# Patient Record
Sex: MALE | Race: BLACK OR AFRICAN AMERICAN | Employment: UNEMPLOYED | URBAN - METROPOLITAN AREA
[De-identification: names, ages, dates, MRNs, and addresses within clinical notes are randomized per-mention and may not be internally consistent; named-entity substitution may affect disease eponyms.]

---

## 2020-10-27 ENCOUNTER — HOSPITAL ENCOUNTER (INPATIENT)
Age: 40
LOS: 2 days | Discharge: HOME | DRG: 816 | End: 2020-10-30
Attending: EMERGENCY MEDICINE | Admitting: FAMILY MEDICINE
Payer: COMMERCIAL

## 2020-10-27 ENCOUNTER — APPOINTMENT (OUTPATIENT)
Dept: GENERAL RADIOLOGY | Age: 40
DRG: 816 | End: 2020-10-27
Payer: COMMERCIAL

## 2020-10-27 LAB
ACETAMINOPHEN LEVEL: <5 MCG/ML (ref 10–30)
ALBUMIN SERPL-MCNC: 4.4 G/DL (ref 3.5–5.2)
ALP BLD-CCNC: 76 U/L (ref 40–129)
ALT SERPL-CCNC: 39 U/L (ref 0–40)
ANION GAP SERPL CALCULATED.3IONS-SCNC: 18 MMOL/L (ref 7–16)
AST SERPL-CCNC: 46 U/L (ref 0–39)
BASOPHILS ABSOLUTE: 0.01 E9/L (ref 0–0.2)
BASOPHILS RELATIVE PERCENT: 0.1 % (ref 0–2)
BILIRUB SERPL-MCNC: <0.2 MG/DL (ref 0–1.2)
BUN BLDV-MCNC: 9 MG/DL (ref 6–20)
CALCIUM SERPL-MCNC: 8.8 MG/DL (ref 8.6–10.2)
CHLORIDE BLD-SCNC: 100 MMOL/L (ref 98–107)
CO2: 23 MMOL/L (ref 22–29)
CREAT SERPL-MCNC: 0.9 MG/DL (ref 0.7–1.2)
EOSINOPHILS ABSOLUTE: 0.01 E9/L (ref 0.05–0.5)
EOSINOPHILS RELATIVE PERCENT: 0.1 % (ref 0–6)
ETHANOL: 230 MG/DL (ref 0–0.08)
GFR AFRICAN AMERICAN: >60
GFR NON-AFRICAN AMERICAN: >60 ML/MIN/1.73
GLUCOSE BLD-MCNC: 112 MG/DL (ref 74–99)
HCT VFR BLD CALC: 44.6 % (ref 37–54)
HEMOGLOBIN: 15.3 G/DL (ref 12.5–16.5)
IMMATURE GRANULOCYTES #: 0.03 E9/L
IMMATURE GRANULOCYTES %: 0.3 % (ref 0–5)
LYMPHOCYTES ABSOLUTE: 1.4 E9/L (ref 1.5–4)
LYMPHOCYTES RELATIVE PERCENT: 15.2 % (ref 20–42)
MAGNESIUM: 2.1 MG/DL (ref 1.6–2.6)
MCH RBC QN AUTO: 33.6 PG (ref 26–35)
MCHC RBC AUTO-ENTMCNC: 34.3 % (ref 32–34.5)
MCV RBC AUTO: 97.8 FL (ref 80–99.9)
MONOCYTES ABSOLUTE: 0.62 E9/L (ref 0.1–0.95)
MONOCYTES RELATIVE PERCENT: 6.7 % (ref 2–12)
NEUTROPHILS ABSOLUTE: 7.16 E9/L (ref 1.8–7.3)
NEUTROPHILS RELATIVE PERCENT: 77.6 % (ref 43–80)
PDW BLD-RTO: 13.2 FL (ref 11.5–15)
PLATELET # BLD: 223 E9/L (ref 130–450)
PMV BLD AUTO: 11.3 FL (ref 7–12)
POTASSIUM REFLEX MAGNESIUM: 3.2 MMOL/L (ref 3.5–5)
RBC # BLD: 4.56 E12/L (ref 3.8–5.8)
REASON FOR REJECTION: NORMAL
REJECTED TEST: NORMAL
SALICYLATE, SERUM: <0.3 MG/DL (ref 0–30)
SODIUM BLD-SCNC: 141 MMOL/L (ref 132–146)
TOTAL PROTEIN: 7.9 G/DL (ref 6.4–8.3)
TRICYCLIC ANTIDEPRESSANTS SCREEN SERUM: NEGATIVE NG/ML
WBC # BLD: 9.2 E9/L (ref 4.5–11.5)

## 2020-10-27 PROCEDURE — 83735 ASSAY OF MAGNESIUM: CPT

## 2020-10-27 PROCEDURE — 6370000000 HC RX 637 (ALT 250 FOR IP): Performed by: EMERGENCY MEDICINE

## 2020-10-27 PROCEDURE — 80053 COMPREHEN METABOLIC PANEL: CPT

## 2020-10-27 PROCEDURE — 71045 X-RAY EXAM CHEST 1 VIEW: CPT

## 2020-10-27 PROCEDURE — 85025 COMPLETE CBC W/AUTO DIFF WBC: CPT

## 2020-10-27 PROCEDURE — 80307 DRUG TEST PRSMV CHEM ANLYZR: CPT

## 2020-10-27 PROCEDURE — 96375 TX/PRO/DX INJ NEW DRUG ADDON: CPT

## 2020-10-27 PROCEDURE — 93005 ELECTROCARDIOGRAM TRACING: CPT | Performed by: EMERGENCY MEDICINE

## 2020-10-27 PROCEDURE — 36415 COLL VENOUS BLD VENIPUNCTURE: CPT

## 2020-10-27 PROCEDURE — G0480 DRUG TEST DEF 1-7 CLASSES: HCPCS

## 2020-10-27 PROCEDURE — 99285 EMERGENCY DEPT VISIT HI MDM: CPT

## 2020-10-27 PROCEDURE — 6360000002 HC RX W HCPCS: Performed by: EMERGENCY MEDICINE

## 2020-10-27 PROCEDURE — 96374 THER/PROPH/DIAG INJ IV PUSH: CPT

## 2020-10-27 RX ORDER — ONDANSETRON 4 MG/1
4 TABLET, ORALLY DISINTEGRATING ORAL ONCE
Status: COMPLETED | OUTPATIENT
Start: 2020-10-27 | End: 2020-10-27

## 2020-10-27 RX ORDER — NALOXONE HYDROCHLORIDE 1 MG/ML
INJECTION INTRAMUSCULAR; INTRAVENOUS; SUBCUTANEOUS
Status: DISPENSED
Start: 2020-10-27 | End: 2020-10-28

## 2020-10-27 RX ORDER — ONDANSETRON 2 MG/ML
4 INJECTION INTRAMUSCULAR; INTRAVENOUS ONCE
Status: COMPLETED | OUTPATIENT
Start: 2020-10-27 | End: 2020-10-27

## 2020-10-27 RX ORDER — NALOXONE HYDROCHLORIDE 1 MG/ML
2 INJECTION INTRAMUSCULAR; INTRAVENOUS; SUBCUTANEOUS ONCE
Status: COMPLETED | OUTPATIENT
Start: 2020-10-27 | End: 2020-10-27

## 2020-10-27 RX ADMIN — NALOXONE HYDROCHLORIDE 2 MG: 1 INJECTION PARENTERAL at 21:53

## 2020-10-27 RX ADMIN — ONDANSETRON 4 MG: 2 INJECTION INTRAMUSCULAR; INTRAVENOUS at 22:51

## 2020-10-27 RX ADMIN — ONDANSETRON 4 MG: 4 TABLET, ORALLY DISINTEGRATING ORAL at 20:26

## 2020-10-28 ENCOUNTER — APPOINTMENT (OUTPATIENT)
Dept: CT IMAGING | Age: 40
DRG: 816 | End: 2020-10-28
Payer: COMMERCIAL

## 2020-10-28 PROBLEM — K21.9 GERD (GASTROESOPHAGEAL REFLUX DISEASE): Status: ACTIVE | Noted: 2020-10-28

## 2020-10-28 PROBLEM — T43.601A OVERDOSE OF ILLICIT DRUG (HCC): Status: ACTIVE | Noted: 2020-10-28

## 2020-10-28 PROBLEM — J96.01 ACUTE HYPOXEMIC RESPIRATORY FAILURE (HCC): Status: ACTIVE | Noted: 2020-10-28

## 2020-10-28 PROBLEM — R11.10 INTRACTABLE VOMITING: Status: ACTIVE | Noted: 2020-10-28

## 2020-10-28 PROBLEM — F17.200 TOBACCO DEPENDENCE: Status: ACTIVE | Noted: 2020-10-28

## 2020-10-28 PROBLEM — E87.6 HYPOKALEMIA: Status: ACTIVE | Noted: 2020-10-28

## 2020-10-28 PROBLEM — F10.929 ALCOHOL INTOXICATION (HCC): Status: ACTIVE | Noted: 2020-10-28

## 2020-10-28 PROBLEM — E78.5 HLD (HYPERLIPIDEMIA): Status: ACTIVE | Noted: 2020-10-28

## 2020-10-28 PROBLEM — I10 HTN (HYPERTENSION): Status: ACTIVE | Noted: 2020-10-28

## 2020-10-28 PROBLEM — G92.9 TOXIC ENCEPHALOPATHY: Status: ACTIVE | Noted: 2020-10-28

## 2020-10-28 PROBLEM — F10.10 ALCOHOL ABUSE: Status: ACTIVE | Noted: 2020-10-28

## 2020-10-28 PROBLEM — F19.10 POLYSUBSTANCE ABUSE (HCC): Status: ACTIVE | Noted: 2020-10-28

## 2020-10-28 LAB
AMPHETAMINE SCREEN, URINE: NOT DETECTED
ANION GAP SERPL CALCULATED.3IONS-SCNC: 12 MMOL/L (ref 7–16)
BARBITURATE SCREEN URINE: NOT DETECTED
BASOPHILS ABSOLUTE: 0.03 E9/L (ref 0–0.2)
BASOPHILS RELATIVE PERCENT: 0.2 % (ref 0–2)
BENZODIAZEPINE SCREEN, URINE: NOT DETECTED
BUN BLDV-MCNC: 9 MG/DL (ref 6–20)
CALCIUM SERPL-MCNC: 8.3 MG/DL (ref 8.6–10.2)
CANNABINOID SCREEN URINE: POSITIVE
CHLORIDE BLD-SCNC: 96 MMOL/L (ref 98–107)
CO2: 26 MMOL/L (ref 22–29)
COCAINE METABOLITE SCREEN URINE: NOT DETECTED
CREAT SERPL-MCNC: 0.8 MG/DL (ref 0.7–1.2)
EKG ATRIAL RATE: 105 BPM
EKG P AXIS: 60 DEGREES
EKG P-R INTERVAL: 142 MS
EKG Q-T INTERVAL: 336 MS
EKG QRS DURATION: 78 MS
EKG QTC CALCULATION (BAZETT): 444 MS
EKG R AXIS: 77 DEGREES
EKG T AXIS: 66 DEGREES
EKG VENTRICULAR RATE: 105 BPM
EOSINOPHILS ABSOLUTE: 0.01 E9/L (ref 0.05–0.5)
EOSINOPHILS RELATIVE PERCENT: 0.1 % (ref 0–6)
FENTANYL SCREEN, URINE: POSITIVE
GFR AFRICAN AMERICAN: >60
GFR NON-AFRICAN AMERICAN: >60 ML/MIN/1.73
GLUCOSE BLD-MCNC: 95 MG/DL (ref 74–99)
HCT VFR BLD CALC: 40.1 % (ref 37–54)
HEMOGLOBIN: 13.8 G/DL (ref 12.5–16.5)
IMMATURE GRANULOCYTES #: 0.1 E9/L
IMMATURE GRANULOCYTES %: 0.6 % (ref 0–5)
LYMPHOCYTES ABSOLUTE: 2.46 E9/L (ref 1.5–4)
LYMPHOCYTES RELATIVE PERCENT: 13.6 % (ref 20–42)
Lab: ABNORMAL
MAGNESIUM: 1.5 MG/DL (ref 1.6–2.6)
MCH RBC QN AUTO: 33.6 PG (ref 26–35)
MCHC RBC AUTO-ENTMCNC: 34.4 % (ref 32–34.5)
MCV RBC AUTO: 97.6 FL (ref 80–99.9)
METHADONE SCREEN, URINE: NOT DETECTED
MONOCYTES ABSOLUTE: 1.25 E9/L (ref 0.1–0.95)
MONOCYTES RELATIVE PERCENT: 6.9 % (ref 2–12)
NEUTROPHILS ABSOLUTE: 14.18 E9/L (ref 1.8–7.3)
NEUTROPHILS RELATIVE PERCENT: 78.6 % (ref 43–80)
OPIATE SCREEN URINE: NOT DETECTED
OXYCODONE URINE: NOT DETECTED
PDW BLD-RTO: 13.2 FL (ref 11.5–15)
PHENCYCLIDINE SCREEN URINE: NOT DETECTED
PLATELET # BLD: 192 E9/L (ref 130–450)
PMV BLD AUTO: 11.6 FL (ref 7–12)
POTASSIUM REFLEX MAGNESIUM: 3.3 MMOL/L (ref 3.5–5)
RBC # BLD: 4.11 E12/L (ref 3.8–5.8)
SODIUM BLD-SCNC: 134 MMOL/L (ref 132–146)
TOTAL CK: 376 U/L (ref 20–200)
WBC # BLD: 18 E9/L (ref 4.5–11.5)

## 2020-10-28 PROCEDURE — 85025 COMPLETE CBC W/AUTO DIFF WBC: CPT

## 2020-10-28 PROCEDURE — 6360000002 HC RX W HCPCS: Performed by: FAMILY MEDICINE

## 2020-10-28 PROCEDURE — 2580000003 HC RX 258: Performed by: FAMILY MEDICINE

## 2020-10-28 PROCEDURE — 80307 DRUG TEST PRSMV CHEM ANLYZR: CPT

## 2020-10-28 PROCEDURE — 93010 ELECTROCARDIOGRAM REPORT: CPT | Performed by: INTERNAL MEDICINE

## 2020-10-28 PROCEDURE — 2060000000 HC ICU INTERMEDIATE R&B

## 2020-10-28 PROCEDURE — 83735 ASSAY OF MAGNESIUM: CPT

## 2020-10-28 PROCEDURE — 70450 CT HEAD/BRAIN W/O DYE: CPT

## 2020-10-28 PROCEDURE — 2500000003 HC RX 250 WO HCPCS: Performed by: FAMILY MEDICINE

## 2020-10-28 PROCEDURE — 80048 BASIC METABOLIC PNL TOTAL CA: CPT

## 2020-10-28 PROCEDURE — 36415 COLL VENOUS BLD VENIPUNCTURE: CPT

## 2020-10-28 PROCEDURE — C9113 INJ PANTOPRAZOLE SODIUM, VIA: HCPCS | Performed by: FAMILY MEDICINE

## 2020-10-28 PROCEDURE — 6370000000 HC RX 637 (ALT 250 FOR IP): Performed by: FAMILY MEDICINE

## 2020-10-28 PROCEDURE — 82550 ASSAY OF CK (CPK): CPT

## 2020-10-28 RX ORDER — ONDANSETRON 2 MG/ML
4 INJECTION INTRAMUSCULAR; INTRAVENOUS EVERY 6 HOURS PRN
Status: DISCONTINUED | OUTPATIENT
Start: 2020-10-28 | End: 2020-10-30 | Stop reason: HOSPADM

## 2020-10-28 RX ORDER — PROMETHAZINE HYDROCHLORIDE 25 MG/1
12.5 TABLET ORAL EVERY 6 HOURS PRN
Status: DISCONTINUED | OUTPATIENT
Start: 2020-10-28 | End: 2020-10-30 | Stop reason: HOSPADM

## 2020-10-28 RX ORDER — POTASSIUM CHLORIDE 7.45 MG/ML
10 INJECTION INTRAVENOUS
Status: COMPLETED | OUTPATIENT
Start: 2020-10-28 | End: 2020-10-28

## 2020-10-28 RX ORDER — PANTOPRAZOLE SODIUM 40 MG/10ML
40 INJECTION, POWDER, LYOPHILIZED, FOR SOLUTION INTRAVENOUS DAILY
Status: DISCONTINUED | OUTPATIENT
Start: 2020-10-28 | End: 2020-10-28

## 2020-10-28 RX ORDER — AMLODIPINE BESYLATE 10 MG/1
10 TABLET ORAL DAILY
COMMUNITY

## 2020-10-28 RX ORDER — LISINOPRIL 10 MG/1
10 TABLET ORAL DAILY
Status: ON HOLD | COMMUNITY
End: 2021-08-12 | Stop reason: HOSPADM

## 2020-10-28 RX ORDER — PANTOPRAZOLE SODIUM 40 MG/10ML
40 INJECTION, POWDER, LYOPHILIZED, FOR SOLUTION INTRAVENOUS DAILY
Status: DISCONTINUED | OUTPATIENT
Start: 2020-10-28 | End: 2020-10-30 | Stop reason: HOSPADM

## 2020-10-28 RX ORDER — THIAMINE MONONITRATE (VIT B1) 100 MG
100 TABLET ORAL DAILY
Status: DISCONTINUED | OUTPATIENT
Start: 2020-10-28 | End: 2020-10-30 | Stop reason: HOSPADM

## 2020-10-28 RX ORDER — HYDROCHLOROTHIAZIDE 25 MG/1
25 TABLET ORAL DAILY
Status: ON HOLD | COMMUNITY
End: 2021-08-12 | Stop reason: HOSPADM

## 2020-10-28 RX ORDER — SODIUM CHLORIDE 0.9 % (FLUSH) 0.9 %
10 SYRINGE (ML) INJECTION EVERY 12 HOURS SCHEDULED
Status: DISCONTINUED | OUTPATIENT
Start: 2020-10-28 | End: 2020-10-30 | Stop reason: HOSPADM

## 2020-10-28 RX ORDER — FOLIC ACID 1 MG/1
1 TABLET ORAL DAILY
Status: DISCONTINUED | OUTPATIENT
Start: 2020-10-28 | End: 2020-10-30 | Stop reason: HOSPADM

## 2020-10-28 RX ORDER — SODIUM CHLORIDE 0.9 % (FLUSH) 0.9 %
10 SYRINGE (ML) INJECTION PRN
Status: DISCONTINUED | OUTPATIENT
Start: 2020-10-28 | End: 2020-10-30 | Stop reason: HOSPADM

## 2020-10-28 RX ORDER — FAMOTIDINE 20 MG/1
20 TABLET, FILM COATED ORAL 2 TIMES DAILY
COMMUNITY
End: 2021-10-28

## 2020-10-28 RX ORDER — M-VIT,TX,IRON,MINS/CALC/FOLIC 27MG-0.4MG
1 TABLET ORAL DAILY
Status: DISCONTINUED | OUTPATIENT
Start: 2020-10-28 | End: 2020-10-30 | Stop reason: HOSPADM

## 2020-10-28 RX ORDER — ATORVASTATIN CALCIUM 20 MG/1
20 TABLET, FILM COATED ORAL DAILY
Status: ON HOLD | COMMUNITY
End: 2020-12-14 | Stop reason: SDUPTHER

## 2020-10-28 RX ORDER — PANTOPRAZOLE SODIUM 40 MG/10ML
40 INJECTION, POWDER, LYOPHILIZED, FOR SOLUTION INTRAVENOUS DAILY
Status: DISCONTINUED | OUTPATIENT
Start: 2020-10-28 | End: 2020-10-28 | Stop reason: CLARIF

## 2020-10-28 RX ADMIN — SODIUM CHLORIDE, PRESERVATIVE FREE 10 ML: 5 INJECTION INTRAVENOUS at 08:17

## 2020-10-28 RX ADMIN — PANTOPRAZOLE SODIUM 40 MG: 40 INJECTION, POWDER, FOR SOLUTION INTRAVENOUS at 08:12

## 2020-10-28 RX ADMIN — SODIUM CHLORIDE 3 G: 900 INJECTION INTRAVENOUS at 18:57

## 2020-10-28 RX ADMIN — ONDANSETRON 4 MG: 2 INJECTION INTRAMUSCULAR; INTRAVENOUS at 08:17

## 2020-10-28 RX ADMIN — SODIUM CHLORIDE 3 G: 900 INJECTION INTRAVENOUS at 08:11

## 2020-10-28 RX ADMIN — SODIUM CHLORIDE 3 G: 900 INJECTION INTRAVENOUS at 12:55

## 2020-10-28 RX ADMIN — POTASSIUM CHLORIDE 10 MEQ: 10 INJECTION, SOLUTION INTRAVENOUS at 03:44

## 2020-10-28 RX ADMIN — Medication 10 ML: at 20:30

## 2020-10-28 RX ADMIN — MULTIPLE VITAMINS W/ MINERALS TAB 1 TABLET: TAB at 08:11

## 2020-10-28 RX ADMIN — Medication 10 ML: at 08:12

## 2020-10-28 RX ADMIN — POTASSIUM CHLORIDE 10 MEQ: 10 INJECTION, SOLUTION INTRAVENOUS at 05:14

## 2020-10-28 RX ADMIN — Medication 100 MG: at 08:11

## 2020-10-28 RX ADMIN — FOLIC ACID 1 MG: 1 TABLET ORAL at 08:22

## 2020-10-28 RX ADMIN — THIAMINE HYDROCHLORIDE: 100 INJECTION, SOLUTION INTRAMUSCULAR; INTRAVENOUS at 02:53

## 2020-10-28 ASSESSMENT — PAIN SCALES - GENERAL
PAINLEVEL_OUTOF10: 0

## 2020-10-28 NOTE — ED NOTES
Please call x [phone] to request psych consult prior to Pt being discharged    Thank you     Laure Ramírez, MSW, LSW  10/28/20 7542

## 2020-10-28 NOTE — ED NOTES
In room to introduce self to pt. Pt requesting PO intake at this time. Explained to pt that he was recently medicated for n/v.  Pt currently denies. This nurse to have conversation with previous nurse. Previous RN states that every time pt attempted PO intake, pt vomited. Pt provided with ice chips. Made aware about issue with emesis. Informed pt that if he tolerates ice chips, we can progress to something else. Pt and friend expressed understanding. Pt respirations are easy and unlabored. Cardiac monitoring in place. No signs of distress noted. Call light within reach. Friend remains at bedside.   Will continue to monitor     Deanna Powell RN  10/27/20 2547

## 2020-10-28 NOTE — ED NOTES
Pt noted to be tolerating PO fluids without nausea/emesis. Warm blankets applied. Pt made aware of CT scan ordered. Pt verbalized understanding of need for scan. Noted to have small lac above left eye, dried blood/scabbing present. Pt states he fell a day or so ago while intoxicated and not sure if he has a concussion. Declined additional comfort measures. Cardiac monitoring continued. Respirations are easy and unlabored. No signs of distress noted. Call light within reach.   Will continue to monitor     Allen Centeno RN  10/28/20 1000

## 2020-10-28 NOTE — ED PROVIDER NOTES
HPI:  10/27/20,   Time: 8:19 PM EDT       Conception Lilo is a 36 y.o. male presenting to the ED for od, beginning 1 hr ago. The complaint has been intermittent, severe in severity, and worsened by nothing. od on narc, awoke with narcan,  N/v since. Bib ems. Review of Systems:   Pertinent positives and negatives are stated within HPI, all other systems reviewed and are negative.          --------------------------------------------- PAST HISTORY ---------------------------------------------  Past Medical History:  has no past medical history on file. Past Surgical History:  has no past surgical history on file. Social History:  reports that he has been smoking cigarettes. He has been smoking about 1.00 pack per day. He does not have any smokeless tobacco history on file. He reports current alcohol use. He reports current drug use. Drug: IV. Family History: family history is not on file. The patients home medications have been reviewed. Allergies: Patient has no allergy information on record.        ---------------------------------------------------PHYSICAL EXAM--------------------------------------    Constitutional/General: Alert and oriented x3, well appearing, non toxic in NAD  Head: Normocephalic and atraumatic  Eyes: PERRL, EOMI, conjunctive normal, sclera non icteric  Mouth: Oropharynx clear, handling secretions, no trismus, no asymmetry of the posterior oropharynx or uvular edema  Neck: Supple, full ROM, non tender to palpation in the midline, no stridor, no crepitus, no meningeal signs  Respiratory: Lungs clear to auscultation bilaterally, no wheezes, rales, or rhonchi. Not in respiratory distress  Cardiovascular:  tachy rate. Regular rhythm. No murmurs, gallops, or rubs. 2+ distal pulses  Chest: No chest wall tenderness  GI:  Abdomen Soft, Non tender, Non distended. +BS. No organomegaly, no palpable masses,  No rebound, guarding, or rigidity.    Musculoskeletal: Moves all extremities x 4. Warm and well perfused, no clubbing, cyanosis, or edema. Capillary refill <3 seconds  Integument: skin warm and dry. No rashes. Lymphatic: no lymphadenopathy noted  Neurologic: GCS 15, no focal deficits, symmetric strength 5/5 in the upper and lower extremities bilaterally  Psychiatric: Normal Affect    -------------------------------------------------- RESULTS -------------------------------------------------  I have personally reviewed all laboratory and imaging results for this patient. Results are listed below.      LABS:  Results for orders placed or performed during the hospital encounter of 10/27/20   CBC Auto Differential   Result Value Ref Range    WBC 9.2 4.5 - 11.5 E9/L    RBC 4.56 3.80 - 5.80 E12/L    Hemoglobin 15.3 12.5 - 16.5 g/dL    Hematocrit 44.6 37.0 - 54.0 %    MCV 97.8 80.0 - 99.9 fL    MCH 33.6 26.0 - 35.0 pg    MCHC 34.3 32.0 - 34.5 %    RDW 13.2 11.5 - 15.0 fL    Platelets 383 935 - 237 E9/L    MPV 11.3 7.0 - 12.0 fL    Neutrophils % 77.6 43.0 - 80.0 %    Immature Granulocytes % 0.3 0.0 - 5.0 %    Lymphocytes % 15.2 (L) 20.0 - 42.0 %    Monocytes % 6.7 2.0 - 12.0 %    Eosinophils % 0.1 0.0 - 6.0 %    Basophils % 0.1 0.0 - 2.0 %    Neutrophils Absolute 7.16 1.80 - 7.30 E9/L    Immature Granulocytes # 0.03 E9/L    Lymphocytes Absolute 1.40 (L) 1.50 - 4.00 E9/L    Monocytes Absolute 0.62 0.10 - 0.95 E9/L    Eosinophils Absolute 0.01 (L) 0.05 - 0.50 E9/L    Basophils Absolute 0.01 0.00 - 0.20 E9/L   Urine Drug Screen   Result Value Ref Range    Amphetamine Screen, Urine NOT DETECTED Negative <1000 ng/mL    Barbiturate Screen, Ur NOT DETECTED Negative < 200 ng/mL    Benzodiazepine Screen, Urine NOT DETECTED Negative < 200 ng/mL    Cannabinoid Scrn, Ur POSITIVE (A) Negative < 50ng/mL    Cocaine Metabolite Screen, Urine NOT DETECTED Negative < 300 ng/mL    Opiate Scrn, Ur NOT DETECTED Negative < 300ng/mL    PCP Screen, Urine NOT DETECTED Negative < 25 ng/mL    Methadone Screen, - 450 E9/L    MPV 11.6 7.0 - 12.0 fL    Neutrophils % 78.6 43.0 - 80.0 %    Immature Granulocytes % 0.6 0.0 - 5.0 %    Lymphocytes % 13.6 (L) 20.0 - 42.0 %    Monocytes % 6.9 2.0 - 12.0 %    Eosinophils % 0.1 0.0 - 6.0 %    Basophils % 0.2 0.0 - 2.0 %    Neutrophils Absolute 14.18 (H) 1.80 - 7.30 E9/L    Immature Granulocytes # 0.10 E9/L    Lymphocytes Absolute 2.46 1.50 - 4.00 E9/L    Monocytes Absolute 1.25 (H) 0.10 - 0.95 E9/L    Eosinophils Absolute 0.01 (L) 0.05 - 0.50 E9/L    Basophils Absolute 0.03 0.00 - 0.20 E9/L   CK   Result Value Ref Range    Total  (H) 20 - 200 U/L   Magnesium   Result Value Ref Range    Magnesium 1.5 (L) 1.6 - 2.6 mg/dL   EKG 12 Lead   Result Value Ref Range    Ventricular Rate 105 BPM    Atrial Rate 105 BPM    P-R Interval 142 ms    QRS Duration 78 ms    Q-T Interval 336 ms    QTc Calculation (Bazett) 444 ms    P Axis 60 degrees    R Axis 77 degrees    T Axis 66 degrees       RADIOLOGY:  Interpreted by Radiologist.  CT HEAD WO CONTRAST   Final Result   1. No acute intracranial abnormality. 2. Mucosal changes and air-fluid level in the right maxillary sinus. This   may be positional, but correlate with any symptoms of sinusitis. XR CHEST PORTABLE   Final Result   1. No evidence of active cardiopulmonary pathology. 2.  Curvilinear radiopaque density overlies the lateral aspect of the left   mid thorax and is of uncertain clinical significance. EKG:  This EKG is signed and interpreted by the EP. Time: 2113  Rate: 100  Rhythm: Sinus  Interpretation: non-specific EKG  Comparison: None      ------------------------- NURSING NOTES AND VITALS REVIEWED ---------------------------   The nursing notes within the ED encounter and vital signs as below have been reviewed by myself.   /75   Pulse 90   Temp 98.6 °F (37 °C) (Temporal)   Resp 18   SpO2 91%   Oxygen Saturation Interpretation: abnml but improved with o2    The patients available past medical records and past encounters were reviewed. ------------------------------ ED COURSE/MEDICAL DECISION MAKING----------------------  Medications   sodium chloride flush 0.9 % injection 10 mL (10 mLs Intravenous Given 10/28/20 0812)   sodium chloride flush 0.9 % injection 10 mL (10 mLs Intravenous Given 10/28/20 0817)   promethazine (PHENERGAN) tablet 12.5 mg ( Oral See Alternative 10/28/20 0817)     Or   ondansetron (ZOFRAN) injection 4 mg (4 mg Intravenous Given 10/28/20 0817)   pantoprazole (PROTONIX) injection 40 mg (40 mg Intravenous Given 10/28/20 0812)   vitamin B-1 (THIAMINE) tablet 100 mg (100 mg Oral Given 03/56/08 6244)   folic acid (FOLVITE) tablet 1 mg (1 mg Oral Given 10/28/20 5799)   therapeutic multivitamin-minerals 1 tablet (1 tablet Oral Given 10/28/20 0811)   ampicillin-sulbactam (UNASYN) 3 g ivpb minibag (0 g Intravenous Stopped 10/28/20 1353)   ondansetron (ZOFRAN-ODT) disintegrating tablet 4 mg (4 mg Oral Given 10/27/20 2026)   naloxone [facility]) injection 2 mg (2 mg Intravenous Given 10/27/20 2153)   ondansetron (ZOFRAN) injection 4 mg (4 mg Intravenous Given 10/27/20 2251)   sodium chloride 0.9 % 8,471 mL with folic acid 1 mg, adult multi-vitamin with vitamin k 10 mL, thiamine 100 mg ( Intravenous New Bag 10/28/20 0253)   potassium chloride 10 mEq/100 mL IVPB (Peripheral Line) (10 mEq Intravenous New Bag 10/28/20 0514)         ED COURSE:       Medical Decision Making:    Od, narcan by ems, hypoxic here and dec loc, re dosed with narcan, stable with 2l nc, no pulm edema on xr, will admit for further care      This patient's ED course included: a personal history and physicial examination    This patient has remained hemodynamically stable during their ED course. Re-Evaluations:             Re-evaluation.   Patients symptoms worsened then improved with narcen    Re-examination  10/27/20   9:19 PM EDT          Vital Signs:   Vitals:    10/27/20 2355 10/28/20 2322 10/28/20 7669 10/28/20 0744   BP: 122/85 (!) 130/92 130/84 133/75   Pulse: 95 89 96 90   Resp: 16 18 18 18   Temp: 98 °F (36.7 °C) 98.1 °F (36.7 °C) 98.6 °F (37 °C) 98.6 °F (37 °C)   TempSrc:   Temporal Temporal   SpO2: 96% 95% 94% 91%     Sleepy, spo2 dropped when taken of nc        Consultations:             sound    Critical Care: 35 min        Counseling: The emergency provider has spoken with the patient and discussed todays results, in addition to providing specific details for the plan of care and counseling regarding the diagnosis and prognosis. Questions are answered at this time and they are agreeable with the plan.       --------------------------------- IMPRESSION AND DISPOSITION ---------------------------------    IMPRESSION  1. [facility]    2. Hypoxia    DISPOSITION  Disposition: Admit to telemetry  Patient condition is stable    NOTE: This report was transcribed using voice recognition software.  Every effort was made to ensure accuracy; however, inadvertent computerized transcription errors may be present        Salvador Byrd MD  10/28/20 4730

## 2020-10-28 NOTE — ED NOTES
Pt noted to be in personal attire at this time. Pt not agreeable to putting hospital attire on despite presence of emesis on his clothes. Per previous RN, pt had refused hospital attire for her as well.      Zully Little RN  10/27/20 5312

## 2020-10-28 NOTE — H&P
Hospital Medicine History & Physical      PCP: No primary care provider on file. Date of Admission: 10/27/2020    Date of Service: Pt seen/examined on 10/28/2020 and Admitted to Inpatient with expected LOS greater than two midnights due to medical therapy. Chief Complaint: Drug overdose and altered mental status      History Of Present Illness:      36 y.o. male who presented to [facility] with past medical history of hypertension, hyperlipidemia, polysubstance abuse(cocaine/heroine) and tobacco dependence. Patient was found on his friend's porch yesterday evening after overdosing on heroin. He has been altered since then. Altered mental status is moderate in intensity, on and off, associated with lethargy, feeling cold, multiple episodes of nausea and vomiting. He has been getting relief with Narcan as needed. He would wake up when given Narcan and once it wears off, patient goes back to sleep. He is coughing. He denies any chest pain or shortness of breath. No abdominal pain. No change in bowel habits. Patient states that his friend gave him \"a powder\", he did not know that it was heroin. He also had 2 bottles of wine today. Patient drinks 3-4 bottles of wine every day. Vital signs notable for saturation of 88% on 3 L, pulse rate 110, labs showed potassium of 3.2, blood alcohol level 230, normal CBC and magnesium of 2.1. Chest x-ray is negative. Did show some nonspecific radiopaque material anterior to the left chest.  EKG shows sinus tachycardia rate of 105 with Q waves in V1 and V2. Patient is refusing tests-ABG, he refuses to get out of his home clothing which is covered in emesis. His son is at bedside, patient wants him to stay with him in the hospital.  When told that this would not happen, due to Covid, patient did not want be admitted. After further conversation, he has decided to stay. He is at risk of leaving AMA. He is being admitted for further management.     Past Medical Neurochecks. 6.  Hypokalemia, replace and monitor. 7.  Intractable vomiting, Zofran as needed, PPI  8. GERD with gastritis. Patient is a known alcoholic. PPI. 9.  Hypertension, blood pressures controlled, not on any medication. Is supposed to be on amlodipine, lisinopril and HCTZ. He does not seem to be on any. 10.  Hyperlipidemia, is supposed to be on Lipitor. 11.  Tobacco dependence, smoking cessation  12. Polysubstance abuse. Return for withdrawal, social service consult for rehab consideration. 13.  High risk for leaving the hospital [address] DVT Prophylaxis: SCDs until CT scan of the head is clear of bleed  Diet: No diet orders on file clear liquid diet, advance as tolerated  Code Status: No Order full code    PT/OT Eval Status: As needed    Dispo -inpatient/telemetry       Kristofer MD Sy    Thank you No primary care provider on file. for the opportunity to be involved in this patient's care.

## 2020-10-28 NOTE — CARE COORDINATION
Met with patient and friend at bedside to discuss transition of care. He lives independently. No assistive devices. He recently moved here from [place], living in [facility]. He does not have a PCP, called pre-service and scheduled with new PCP. [address] with  Friday 11/6/2020 @ 8:30am. AVS updated. He tells me his friend will transport home.  CM will follow for any needs that arise  Simi Hennessy RN  [facility] Case Management  [phone]

## 2020-10-29 LAB
ANION GAP SERPL CALCULATED.3IONS-SCNC: 9 MMOL/L (ref 7–16)
BUN BLDV-MCNC: 8 MG/DL (ref 6–20)
CALCIUM SERPL-MCNC: 9 MG/DL (ref 8.6–10.2)
CHLORIDE BLD-SCNC: 99 MMOL/L (ref 98–107)
CK MB: 1.8 NG/ML (ref 0–7.7)
CO2: 31 MMOL/L (ref 22–29)
CREAT SERPL-MCNC: 1 MG/DL (ref 0.7–1.2)
GFR AFRICAN AMERICAN: >60
GFR NON-AFRICAN AMERICAN: >60 ML/MIN/1.73
GLUCOSE BLD-MCNC: 84 MG/DL (ref 74–99)
POTASSIUM REFLEX MAGNESIUM: 4 MMOL/L (ref 3.5–5)
SODIUM BLD-SCNC: 139 MMOL/L (ref 132–146)
TOTAL CK: 178 U/L (ref 20–200)
TROPONIN: <0.01 NG/ML (ref 0–0.03)

## 2020-10-29 PROCEDURE — C9113 INJ PANTOPRAZOLE SODIUM, VIA: HCPCS | Performed by: FAMILY MEDICINE

## 2020-10-29 PROCEDURE — 84484 ASSAY OF TROPONIN QUANT: CPT

## 2020-10-29 PROCEDURE — 2580000003 HC RX 258: Performed by: FAMILY MEDICINE

## 2020-10-29 PROCEDURE — 82550 ASSAY OF CK (CPK): CPT

## 2020-10-29 PROCEDURE — 99253 IP/OBS CNSLTJ NEW/EST LOW 45: CPT | Performed by: INTERNAL MEDICINE

## 2020-10-29 PROCEDURE — 2060000000 HC ICU INTERMEDIATE R&B

## 2020-10-29 PROCEDURE — 82553 CREATINE MB FRACTION: CPT

## 2020-10-29 PROCEDURE — 80048 BASIC METABOLIC PNL TOTAL CA: CPT

## 2020-10-29 PROCEDURE — 6370000000 HC RX 637 (ALT 250 FOR IP): Performed by: INTERNAL MEDICINE

## 2020-10-29 PROCEDURE — 6360000002 HC RX W HCPCS: Performed by: FAMILY MEDICINE

## 2020-10-29 PROCEDURE — 36415 COLL VENOUS BLD VENIPUNCTURE: CPT

## 2020-10-29 PROCEDURE — 6370000000 HC RX 637 (ALT 250 FOR IP): Performed by: FAMILY MEDICINE

## 2020-10-29 PROCEDURE — APPSS60 APP SPLIT SHARED TIME 46-60 MINUTES: Performed by: NURSE PRACTITIONER

## 2020-10-29 RX ORDER — AMLODIPINE BESYLATE 10 MG/1
10 TABLET ORAL DAILY
Status: DISCONTINUED | OUTPATIENT
Start: 2020-10-29 | End: 2020-10-30 | Stop reason: HOSPADM

## 2020-10-29 RX ORDER — ATORVASTATIN CALCIUM 20 MG/1
20 TABLET, FILM COATED ORAL NIGHTLY
Status: DISCONTINUED | OUTPATIENT
Start: 2020-10-29 | End: 2020-10-30 | Stop reason: HOSPADM

## 2020-10-29 RX ORDER — LISINOPRIL 10 MG/1
10 TABLET ORAL DAILY
Status: DISCONTINUED | OUTPATIENT
Start: 2020-10-29 | End: 2020-10-30 | Stop reason: HOSPADM

## 2020-10-29 RX ADMIN — SODIUM CHLORIDE, PRESERVATIVE FREE 10 ML: 5 INJECTION INTRAVENOUS at 06:17

## 2020-10-29 RX ADMIN — Medication 10 ML: at 08:47

## 2020-10-29 RX ADMIN — SODIUM CHLORIDE 3 G: 900 INJECTION INTRAVENOUS at 13:43

## 2020-10-29 RX ADMIN — SODIUM CHLORIDE 3 G: 900 INJECTION INTRAVENOUS at 01:00

## 2020-10-29 RX ADMIN — SODIUM CHLORIDE 3 G: 900 INJECTION INTRAVENOUS at 19:47

## 2020-10-29 RX ADMIN — SODIUM CHLORIDE 3 G: 900 INJECTION INTRAVENOUS at 06:30

## 2020-10-29 RX ADMIN — SODIUM CHLORIDE, PRESERVATIVE FREE 10 ML: 5 INJECTION INTRAVENOUS at 13:43

## 2020-10-29 RX ADMIN — FOLIC ACID 1 MG: 1 TABLET ORAL at 08:47

## 2020-10-29 RX ADMIN — Medication 10 ML: at 19:47

## 2020-10-29 RX ADMIN — LISINOPRIL 10 MG: 10 TABLET ORAL at 20:22

## 2020-10-29 RX ADMIN — Medication 100 MG: at 08:47

## 2020-10-29 RX ADMIN — PANTOPRAZOLE SODIUM 40 MG: 40 INJECTION, POWDER, FOR SOLUTION INTRAVENOUS at 06:17

## 2020-10-29 RX ADMIN — ATORVASTATIN CALCIUM 20 MG: 20 TABLET, FILM COATED ORAL at 20:22

## 2020-10-29 RX ADMIN — MULTIPLE VITAMINS W/ MINERALS TAB 1 TABLET: TAB at 08:47

## 2020-10-29 RX ADMIN — AMLODIPINE BESYLATE 10 MG: 10 TABLET ORAL at 20:22

## 2020-10-29 ASSESSMENT — PAIN SCALES - GENERAL
PAINLEVEL_OUTOF10: 0

## 2020-10-29 NOTE — CONSULTS
Inpatient Cardiology Consultation      Reason for Consult:  Abnormal EKG    Consulting Physician: Dr. Ashely Menjivar    Requesting Physician:  Dr. Harris Aly    Date of Consultation: 10/29/2020    HISTORY OF PRESENT ILLNESS:   Mr. Fredy Lee is a 36year old  male who is previously not known to [facility]) Cardiology Physicians in [facility]. His medical history includes HTN, HLD, current tobacco abuse, cocaine use, ETOH, and GERD. Please note this information was obtained from the patient's current medical chart as he is unavailable to ascertain the events that led to his presentation and subsequent admission. He states that he was \"upset with my son so I went to buy cocaine but it was really fentanyl, and that's the last thing I know\". According to ED documentation, Mr. Bobbi Prasad was found on a friend's porch lethargic and cold and EMS was summoned whom gave him Narcan. Upon arrival to the ED his VS were -88-88% on RA-125/87. EKG ST with septal infarct age undetermined. WBC 9.2. H&H 15.3/44. 6. Tox screen positive for cannibinoids and Fentanyl. K 3.2. BUN/SCr 9/0.9. Ethanol level 230. CT of head with sinusitis. CXR with no acute changes. He received KCL, NS with Vitamin K and Folic Acid, Zofran, and Narcan. He was admitted to a telemetry monitored unit. Cardiology was consulted by Dr. Harris Aly for management of abnormal EKG. Please note: past medical records were reviewed per electronic medical record (EMR) - see detailed reports under Past Medical/ Surgical History. Past Medical and Surgical History:    1. Lexiscan MPS 11/06/2007: Nonischemic, no WMA, EF 55%  2. HTN  3. HLD  4. GERD  5. Current tobacco use  6. Cocaine use (snorts)  7. ETOH  8. Family Hx of premature CAD      Medications Prior to admit:  Prior to Admission medications    Medication Sig Start Date End Date Taking?  Authorizing Provider   atorvastatin (LIPITOR) 20 MG tablet Take 20 mg by mouth daily    Historical Provider, MD   lisinopril (PRINIVIL;ZESTRIL) 10 MG tablet Take 10 mg by mouth daily    Historical Provider, MD   hydroCHLOROthiazide (HYDRODIURIL) 25 MG tablet Take 25 mg by mouth daily    Historical Provider, MD   famotidine (PEPCID) 20 MG tablet Take 20 mg by mouth 2 times daily    Historical Provider, MD   amLODIPine (NORVASC) 10 MG tablet Take 10 mg by mouth daily    Historical Provider, MD       Current Medications:    Current Facility-Administered Medications: sodium chloride flush 0.9 % injection 10 mL, 10 mL, Intravenous, 2 times per day  sodium chloride flush 0.9 % injection 10 mL, 10 mL, Intravenous, PRN  promethazine (PHENERGAN) tablet 12.5 mg, 12.5 mg, Oral, Q6H PRN **OR** ondansetron (ZOFRAN) injection 4 mg, 4 mg, Intravenous, Q6H PRN  pantoprazole (PROTONIX) injection 40 mg, 40 mg, Intravenous, Daily  vitamin B-1 (THIAMINE) tablet 100 mg, 100 mg, Oral, Daily  folic acid (FOLVITE) tablet 1 mg, 1 mg, Oral, Daily  therapeutic multivitamin-minerals 1 tablet, 1 tablet, Oral, Daily  ampicillin-sulbactam (UNASYN) 3 g ivpb minibag, 3 g, Intravenous, Q6H    Allergies:  Patient has no allergy information on record. Social History:    States that he has smoked 1ppd since the age of 6. States that he drinks 2 bottles of wine a day  States that he snorts cocaine, denies previous IV drug use. Family History:   Brother with initial MI and CABG at age of 45. REVIEW OF SYSTEMS:     · Constitutional: Denies fevers, chills, night sweats, and fatigue  · HEENT: Denies headaches, nose bleeds, and blurred vision,oral pain, abscess or lesion. · Musculoskeletal: Denies falls, pain to BLE with ambulation and edema to BLE. · Neurological: Denies dizziness and lightheadedness, numbness and tingling  · Cardiovascular: Denies chest pain, palpitations, and feelings of heart racing.    · Respiratory: Denies orthopnea and PND  · Gastrointestinal: Denies heartburn, nausea/vomiting, diarrhea and constipation, black/bloody, and tarry stools. · Genitourinary: Denies dysuria and hematuria  · Hematologic: Denies excessive bruising or bleeding  · Lymphatic: Denies lumps and bumps to neck, axilla, breast, and groin  · Endocrine: Denies excessive thirst. Denies intolerance to hot and cold  · Psychiatric: Denies anxiety and depression. PHYSICAL EXAM:   BP (!) 174/97   Pulse 78   Temp 97 °F (36.1 °C) (Temporal)   Resp 12   Wt 186 lb 6.4 oz (84.6 kg)   SpO2 92%   CONST:  Well developed, well nourished middle aged male who appears stated age. Awake, alert, cooperative, no apparent distress  HEENT:   Head- Normocephalic, atraumatic   Eyes- Conjunctivae pink, anicteric  Throat- Oral mucosa pink and moist  Neck-  No stridor, trachea midline, no jugular venous distention. No adenopathy   CHEST: Chest symmetrical and non-tender to palpation. No accessory muscle use or intercostal retractions  RESPIRATORY: Lung sounds - clear throughout fields   CARDIOVASCULAR:     No carotid bruit  Heart Inspection- shows no noted pulsations  Heart Palpation- no heaves or thrills; PMI is non-displaced   Heart Ausculation- Regular rate and rhythm, no murmur. No s3, s4 or rub   PV: No lower extremity edema. No varicosities. Pedal pulses palpable, no clubbing or cyanosis   ABDOMEN: Soft, non-tender to light palpation. Bowel sounds present. No palpable masses no organomegaly; no abdominal bruit  MS: Good muscle strength and tone. No atrophy or abnormal movements. : Deferred  SKIN: Warm and dry no statis dermatitis or ulcers   NEURO / PSYCH: Oriented to person, place and time. Speech clear and appropriate. Follows all commands.  Pleasant affect     DATA:    ECG: ST with septal infarct age undetermined  Tele strips: SR   Diagnostic:      Intake/Output Summary (Last 24 hours) at 10/29/2020 1158  Last data filed at 10/29/2020 0849  Gross per 24 hour   Intake 1726 ml   Output 525 ml   Net 1201 ml       Labs:   CBC:   Recent Labs     10/27/20  2201 10/28/20  3846 WBC 9.2 18.0*   HGB 15.3 13.8   HCT 44.6 40.1    192     BMP:   Recent Labs     10/27/20  2250 10/28/20  0832    134   K 3.2* 3.3*   CO2 23 26   BUN 9 9   CREATININE 0.9 0.8   LABGLOM >60 >60   CALCIUM 8.8 8.3*     Mag:   Recent Labs     10/27/20  2250 10/28/20  0832   MG 2.1 1.5*   CARDIAC ENZYMES:  Recent Labs     10/28/20  0832   CKTOTAL 376*   LIVER PROFILE:  Recent Labs     10/27/20  2250   AST 46*   ALT 39   LABALBU 4.4     10/28/2020 CT of Head:   1. No acute intracranial abnormality. 2. Mucosal changes and air-fluid level in the right maxillary sinus.  This may be positional, but correlate with any symptoms of sinusitis. 10/27/2020 CXR:   1.  No evidence of active cardiopulmonary pathology. 2.  Curvilinear radiopaque density overlies the lateral aspect of the left mid thorax and is of uncertain clinical significance. IMPRESSION and PLAN to follow per Dr. Navarrete Clarity    Electronically signed by ALMA Lemus CNP on 10/29/2020 at 11:58 AM     [facility] Cardiology Consult       Darrell Ojeda    I have personally participated in a face-to-face and personally obtained history and performed physical exam on the date of service. I reviewed chart, vitals, labs and radiologic studies. I also participated in medical decision making with ALMA Lemus CNP on the date of service All of the assessments and recommendations are from me and I agree with all of the pertinent clinical information, assessment and treatment plan. I have reviewed and edited the note above based on my findings during my history, exam, and decision making. Please see my additional contributions to the history, physical exam, assessment, and recommendations below. HISTORY OF PRESENT ILLNESS:     As above      Past medical history:  Reviewed, as above. Past surgical history:  Reviewed, as above.     Current medications:  Reviewed, as above    Allergies:  Reviewed, as above    Social history:  Reviewed, as above    Family medical history:  Reviewed, as above. REVIEW OF SYSTEMS:   Reviewed, as above. PHYSICAL EXAM:   CONSTITUTIONAL:  awake, alert, cooperative, no apparent distress, and appears stated age  EYES:  lids and lashes normal and pupils equal, round and reactive to light, anicteric sclerae  HEAD:  normocepalic, without obvious abnormality, atraumatic, pink, moist mucous membranes. NECK:  Supple, symmetrical, trachea midline, no adenopathy, thyroid symmetric, not enlarged and no tenderness, skin normal  HEMATOLOGIC/LYMPHATICS:  no cervical lymphadenopathy and no supraclavicular lymphadenopathy  LUNGS:  No increased work of breathing, good air exchange, clear to auscultation bilaterally, no crackles or wheezing  CARDIOVASCULAR:  Normal apical impulse, regular rate and rhythm, normal S1 and S2, no S3 or S4, 2/6 systolic murmur at the left lower sternal border, no JVD, no carotid bruit, no pedal edema, good carotid upstroke bilaterally. ABDOMEN:  Soft, nontender, no masses, no hepatomegaly or splenomegaly, BS+  CHEST: nontender to palpation, expands symmetrically  MUSCULOSKELETAL:  No clubbing no cyanosis. there is no redness, warmth, or swelling of the joints  full range of motion noted  NEUROLOGIC:  Alert, awake,oriented x3, no focal neurologic deficit was appreciated  SKIN:  no bruising or bleeding, normal skin color, texture, turgor and no redness, warmth, or swelling        BP (!) 164/97   Pulse 77   Temp 97.8 °F (36.6 °C) (Temporal)   Resp 16   Wt 186 lb 6.4 oz (84.6 kg)   SpO2 98%     DATA:   I personally reviewed the admission EKG with the following interpretation: Sinus rhythm, old septal wall MI age undetermined  ECHO: Not performed to date  Stress Test: Not performed to date  Angiography: Not performed to date  Cardiology Labs:   BMP:    Lab Results   Component Value Date     10/29/2020    K 4.0 10/29/2020    CL 99 10/29/2020    CO2 31 10/29/2020    BUN 8 10/29/2020     CMP:    Lab Results   Component Value Date     10/29/2020    K 4.0 10/29/2020    CL 99 10/29/2020    CO2 31 10/29/2020    BUN 8 10/29/2020    PROT 7.9 10/27/2020     CBC:    Lab Results   Component Value Date    WBC 6.7 10/30/2020    RBC 4.46 10/30/2020    HGB 14.7 10/30/2020    HCT 43.3 10/30/2020    MCV 97.1 10/30/2020    RDW 12.6 10/30/2020     10/30/2020     PT/INR:  No results found for: PTINR  PT/INR Warfarin:  No components found for: PTPATWAR, PTINRWAR  PTT:  No results found for: APTT  PTT Heparin:  No components found for: APTTHEP  Magnesium:    Lab Results   Component Value Date    MG 1.5 10/28/2020     TSH:  No results found for: TSH  TROPONIN:  No components found for: TROP  BNP:  No results found for: BNP  FASTING LIPID PANEL:  No results found for: CHOL, HDL, TRIG  CT HEAD WO CONTRAST   Final Result   1. No acute intracranial abnormality. 2. Mucosal changes and air-fluid level in the right maxillary sinus. This   may be positional, but correlate with any symptoms of sinusitis. XR CHEST PORTABLE   Final Result   1. No evidence of active cardiopulmonary pathology. 2.  Curvilinear radiopaque density overlies the lateral aspect of the left   mid thorax and is of uncertain clinical significance. I have personally reviewed the laboratory, cardiac diagnostic and radiographic testing as outlined above:    IMPRESSION:  1. Abnormal EKG: Asymptomatic, will check echocardiogram for further evaluation  2. Hypertension: Not well controlled, will adjust medication  3. Tobacco abuse: Patient was counseled regarding smoking cessation      RECOMMENDATIONS:   1. Echocardiogram  2. Continue current treatment  3.  Further cardiac recommendations will be forthcoming pending his clinical course and diagnostic test findings    I have reviewed my findings and recommendations with patient    Thank you for the consult  Electronically signed by Rola Mcarthur MD on 11/18/2020 at 9:53 AM  NOTE: This report was transcribed using voice recognition software.  Every effort was made to ensure accuracy; however, inadvertent computerized transcription errors may be present   Late entry note

## 2020-10-30 VITALS
DIASTOLIC BLOOD PRESSURE: 97 MMHG | RESPIRATION RATE: 16 BRPM | WEIGHT: 186.4 LBS | HEART RATE: 77 BPM | OXYGEN SATURATION: 98 % | TEMPERATURE: 97.8 F | SYSTOLIC BLOOD PRESSURE: 164 MMHG

## 2020-10-30 LAB
BASOPHILS ABSOLUTE: 0.02 E9/L (ref 0–0.2)
BASOPHILS RELATIVE PERCENT: 0.3 % (ref 0–2)
EOSINOPHILS ABSOLUTE: 0.06 E9/L (ref 0.05–0.5)
EOSINOPHILS RELATIVE PERCENT: 0.9 % (ref 0–6)
HCT VFR BLD CALC: 43.3 % (ref 37–54)
HEMOGLOBIN: 14.7 G/DL (ref 12.5–16.5)
IMMATURE GRANULOCYTES #: 0.02 E9/L
IMMATURE GRANULOCYTES %: 0.3 % (ref 0–5)
LV EF: 63 %
LVEF MODALITY: NORMAL
LYMPHOCYTES ABSOLUTE: 1.99 E9/L (ref 1.5–4)
LYMPHOCYTES RELATIVE PERCENT: 29.9 % (ref 20–42)
MCH RBC QN AUTO: 33 PG (ref 26–35)
MCHC RBC AUTO-ENTMCNC: 33.9 % (ref 32–34.5)
MCV RBC AUTO: 97.1 FL (ref 80–99.9)
MONOCYTES ABSOLUTE: 0.71 E9/L (ref 0.1–0.95)
MONOCYTES RELATIVE PERCENT: 10.7 % (ref 2–12)
NEUTROPHILS ABSOLUTE: 3.85 E9/L (ref 1.8–7.3)
NEUTROPHILS RELATIVE PERCENT: 57.9 % (ref 43–80)
PDW BLD-RTO: 12.6 FL (ref 11.5–15)
PLATELET # BLD: 181 E9/L (ref 130–450)
PMV BLD AUTO: 11.9 FL (ref 7–12)
RBC # BLD: 4.46 E12/L (ref 3.8–5.8)
WBC # BLD: 6.7 E9/L (ref 4.5–11.5)

## 2020-10-30 PROCEDURE — 6370000000 HC RX 637 (ALT 250 FOR IP): Performed by: FAMILY MEDICINE

## 2020-10-30 PROCEDURE — C9113 INJ PANTOPRAZOLE SODIUM, VIA: HCPCS | Performed by: FAMILY MEDICINE

## 2020-10-30 PROCEDURE — 6370000000 HC RX 637 (ALT 250 FOR IP): Performed by: INTERNAL MEDICINE

## 2020-10-30 PROCEDURE — 2580000003 HC RX 258: Performed by: FAMILY MEDICINE

## 2020-10-30 PROCEDURE — 93306 TTE W/DOPPLER COMPLETE: CPT

## 2020-10-30 PROCEDURE — 6360000002 HC RX W HCPCS: Performed by: FAMILY MEDICINE

## 2020-10-30 PROCEDURE — 36415 COLL VENOUS BLD VENIPUNCTURE: CPT

## 2020-10-30 PROCEDURE — 85025 COMPLETE CBC W/AUTO DIFF WBC: CPT

## 2020-10-30 RX ADMIN — SODIUM CHLORIDE 3 G: 900 INJECTION INTRAVENOUS at 13:22

## 2020-10-30 RX ADMIN — SODIUM CHLORIDE, PRESERVATIVE FREE 10 ML: 5 INJECTION INTRAVENOUS at 06:57

## 2020-10-30 RX ADMIN — AMLODIPINE BESYLATE 10 MG: 10 TABLET ORAL at 09:33

## 2020-10-30 RX ADMIN — SODIUM CHLORIDE, PRESERVATIVE FREE 10 ML: 5 INJECTION INTRAVENOUS at 00:36

## 2020-10-30 RX ADMIN — SODIUM CHLORIDE 3 G: 900 INJECTION INTRAVENOUS at 06:58

## 2020-10-30 RX ADMIN — SODIUM CHLORIDE 3 G: 900 INJECTION INTRAVENOUS at 00:36

## 2020-10-30 RX ADMIN — Medication 10 ML: at 09:33

## 2020-10-30 RX ADMIN — PANTOPRAZOLE SODIUM 40 MG: 40 INJECTION, POWDER, FOR SOLUTION INTRAVENOUS at 06:57

## 2020-10-30 RX ADMIN — MULTIPLE VITAMINS W/ MINERALS TAB 1 TABLET: TAB at 09:33

## 2020-10-30 RX ADMIN — FOLIC ACID 1 MG: 1 TABLET ORAL at 09:33

## 2020-10-30 RX ADMIN — Medication 100 MG: at 09:33

## 2020-10-30 RX ADMIN — LISINOPRIL 10 MG: 10 TABLET ORAL at 09:33

## 2020-10-30 ASSESSMENT — PAIN SCALES - GENERAL: PAINLEVEL_OUTOF10: 0

## 2020-10-30 NOTE — DISCHARGE SUMMARY
Hospital Medicine Discharge Summary    Patient ID: Fadi Vincent   Patient's PCP: No primary care provider on file. Admit Date: 10/27/2020   Discharge Date:      Admitting Physician: Dora Porter MD  Discharge Physician: Mukund Morrison MD     Discharge Diagnoses:    Polysubstance abuse  S/p acute hypoxemic respiratory failure  Alcohol intoxication  Possible aspiration pneumonitis  S/p metabolic encephalopathy  Hypokalemia  GERD  Hypertension  Hyperlipidemia  Chronic tobacco abuse  Abnormal EKG       Hospital course in brief (Please refer to daily progress notes for a comprehensive review of the hospitalization by requesting medical records)    I was informed patient left AMA      Consults:   IP CONSULT TO INTERNAL MEDICINE  IP CONSULT TO SOCIAL WORK  IP CONSULT TO CARDIOLOGY    Discharge Instructions / Follow up: AMA    Activity: activity as tolerated    Significant labs:    Labs: For convenience and continuity at follow-up the following most recent labs are provided:  CBC:   Recent Labs     10/27/20  2201 10/28/20  0832 10/30/20  0518   WBC 9.2 18.0* 6.7   RBC 4.56 4.11 4.46   HGB 15.3 13.8 14.7   HCT 44.6 40.1 43.3   MCV 97.8 97.6 97.1   RDW 13.2 13.2 12.6    192 181     BMP:   Recent Labs     10/27/20  2250 10/28/20  0832 10/29/20  1146    134 139   K 3.2* 3.3* 4.0    96* 99   CO2 23 26 31*   BUN 9 9 8   CREATININE 0.9 0.8 1.0   MG 2.1 1.5*  --      LFT:  Recent Labs     10/27/20  2250   PROT 7.9   ALKPHOS 76   ALT 39   AST 46*   BILITOT <0.2     PT/INR: No results for input(s): INR, APTT in the last 72 hours. BNP: No results for input(s): BNP in the last 72 hours.   Hgb A1C: No results found for: LABA1C  Folate and B12: No results found for: [id number], No results found for: FOLATE  Thyroid Studies: No results found for: TSH, G4IBGKC, Q6OTZVQ, THYROIDAB    Urinalysis:  No results found for: NITRU, WBCUA, BACTERIA, RBCUA, BLOODU, SPECGRAV, GLUCOSEU    Imaging:  Ct Head Wo thoracic soft tissue structures demonstrate no acute findings. Radiopaque curvilinear density overlies the lateral aspect of the left mid thorax. 1.  No evidence of active cardiopulmonary pathology. 2.  Curvilinear radiopaque density overlies the lateral aspect of the left mid thorax and is of uncertain clinical significance. Discharge Medications:      Medication List      ASK your doctor about these medications    amLODIPine 10 MG tablet  Commonly known as:  NORVASC     atorvastatin 20 MG tablet  Commonly known as:  LIPITOR     famotidine 20 MG tablet  Commonly known as:  PEPCID     hydroCHLOROthiazide 25 MG tablet  Commonly known as:  HYDRODIURIL     lisinopril 10 MG tablet  Commonly known as:  PRINIVIL;ZESTRIL              Condition at discharge: Stable     Disposition:  AMA    Thank you for the opportunity to be involved in this patient's care. If you have any questions or concerns please feel free to contact me. Time Spent on discharge is more than 35 minutes in the examination, evaluation, counseling and review of medications and discharge plan.    +++++++++++++++++++++++++++++++++++++++++++++++++  Linwood Ortega MD, Hospitalist  +++++++++++++++++++++++++++++++++++++++++++++++++  NOTE: This report was transcribed using voice recognition software. Every effort was made to ensure accuracy; however, inadvertent computerized transcription errors may be present.

## 2020-10-30 NOTE — CARE COORDINATION
Pt planning for home at discharge. He does not anticipate any needs. Scheduled with new PCP. [facility]  with  Friday 11/6/2020 @ 8:30am. AVS updated. CM will continue to follow  Aryan Moeller RN  [facility] Case Management  [phone]

## 2020-11-06 ENCOUNTER — TELEPHONE (OUTPATIENT)
Dept: INTERNAL MEDICINE | Age: 40
End: 2020-11-06

## 2020-12-11 ENCOUNTER — APPOINTMENT (OUTPATIENT)
Dept: GENERAL RADIOLOGY | Age: 40
DRG: 137 | End: 2020-12-11
Payer: COMMERCIAL

## 2020-12-11 ENCOUNTER — APPOINTMENT (OUTPATIENT)
Dept: CT IMAGING | Age: 40
DRG: 137 | End: 2020-12-11
Payer: COMMERCIAL

## 2020-12-11 ENCOUNTER — HOSPITAL ENCOUNTER (INPATIENT)
Age: 40
LOS: 3 days | Discharge: HOME | DRG: 137 | End: 2020-12-14
Attending: EMERGENCY MEDICINE | Admitting: INTERNAL MEDICINE
Payer: COMMERCIAL

## 2020-12-11 PROBLEM — F10.121 ALCOHOL INTOXICATION DELIRIUM (HCC): Status: ACTIVE | Noted: 2020-12-11

## 2020-12-11 PROBLEM — J18.9 PNEUMONIA: Status: ACTIVE | Noted: 2020-12-11

## 2020-12-11 LAB
ACETAMINOPHEN LEVEL: <5 MCG/ML (ref 10–30)
ALBUMIN SERPL-MCNC: 4 G/DL (ref 3.5–5.2)
ALP BLD-CCNC: 63 U/L (ref 40–129)
ALT SERPL-CCNC: 47 U/L (ref 0–40)
AMPHETAMINE SCREEN, URINE: NOT DETECTED
ANION GAP SERPL CALCULATED.3IONS-SCNC: 13 MMOL/L (ref 7–16)
AST SERPL-CCNC: 44 U/L (ref 0–39)
BACTERIA: ABNORMAL /HPF
BARBITURATE SCREEN URINE: NOT DETECTED
BASOPHILS ABSOLUTE: 0.03 E9/L (ref 0–0.2)
BASOPHILS RELATIVE PERCENT: 0.4 % (ref 0–2)
BENZODIAZEPINE SCREEN, URINE: NOT DETECTED
BILIRUB SERPL-MCNC: <0.2 MG/DL (ref 0–1.2)
BILIRUBIN URINE: NEGATIVE
BLOOD, URINE: ABNORMAL
BUN BLDV-MCNC: 9 MG/DL (ref 6–20)
CALCIUM SERPL-MCNC: 8.4 MG/DL (ref 8.6–10.2)
CANNABINOID SCREEN URINE: POSITIVE
CHLORIDE BLD-SCNC: 105 MMOL/L (ref 98–107)
CLARITY: CLEAR
CO2: 22 MMOL/L (ref 22–29)
COCAINE METABOLITE SCREEN URINE: POSITIVE
COLOR: YELLOW
CREAT SERPL-MCNC: 1 MG/DL (ref 0.7–1.2)
EKG ATRIAL RATE: 107 BPM
EKG P AXIS: 67 DEGREES
EKG P-R INTERVAL: 154 MS
EKG Q-T INTERVAL: 314 MS
EKG QRS DURATION: 84 MS
EKG QTC CALCULATION (BAZETT): 419 MS
EKG R AXIS: 83 DEGREES
EKG T AXIS: 62 DEGREES
EKG VENTRICULAR RATE: 107 BPM
EOSINOPHILS ABSOLUTE: 0.02 E9/L (ref 0.05–0.5)
EOSINOPHILS RELATIVE PERCENT: 0.2 % (ref 0–6)
ETHANOL: 262 MG/DL (ref 0–0.08)
FENTANYL SCREEN, URINE: NOT DETECTED
GFR AFRICAN AMERICAN: >60
GFR NON-AFRICAN AMERICAN: >60 ML/MIN/1.73
GLUCOSE BLD-MCNC: 119 MG/DL (ref 74–99)
GLUCOSE URINE: NEGATIVE MG/DL
HCT VFR BLD CALC: 43 % (ref 37–54)
HEMOGLOBIN: 14.4 G/DL (ref 12.5–16.5)
IMMATURE GRANULOCYTES #: 0.03 E9/L
IMMATURE GRANULOCYTES %: 0.4 % (ref 0–5)
KETONES, URINE: NEGATIVE MG/DL
LACTIC ACID: 2.9 MMOL/L (ref 0.5–2.2)
LACTIC ACID: 3.3 MMOL/L (ref 0.5–2.2)
LEUKOCYTE ESTERASE, URINE: NEGATIVE
LIPASE: 59 U/L (ref 13–60)
LYMPHOCYTES ABSOLUTE: 2.4 E9/L (ref 1.5–4)
LYMPHOCYTES RELATIVE PERCENT: 29.3 % (ref 20–42)
Lab: ABNORMAL
MCH RBC QN AUTO: 32.6 PG (ref 26–35)
MCHC RBC AUTO-ENTMCNC: 33.5 % (ref 32–34.5)
MCV RBC AUTO: 97.3 FL (ref 80–99.9)
METHADONE SCREEN, URINE: NOT DETECTED
MONOCYTES ABSOLUTE: 0.45 E9/L (ref 0.1–0.95)
MONOCYTES RELATIVE PERCENT: 5.5 % (ref 2–12)
NEUTROPHILS ABSOLUTE: 5.26 E9/L (ref 1.8–7.3)
NEUTROPHILS RELATIVE PERCENT: 64.2 % (ref 43–80)
NITRITE, URINE: NEGATIVE
OPIATE SCREEN URINE: NOT DETECTED
OXYCODONE URINE: NOT DETECTED
PDW BLD-RTO: 13 FL (ref 11.5–15)
PH UA: 5.5 (ref 5–9)
PHENCYCLIDINE SCREEN URINE: NOT DETECTED
PLATELET # BLD: 338 E9/L (ref 130–450)
PMV BLD AUTO: 10.6 FL (ref 7–12)
POTASSIUM SERPL-SCNC: 4.3 MMOL/L (ref 3.5–5)
PRO-BNP: 78 PG/ML (ref 0–125)
PROTEIN UA: 30 MG/DL
RBC # BLD: 4.42 E12/L (ref 3.8–5.8)
RBC UA: ABNORMAL /HPF (ref 0–2)
SALICYLATE, SERUM: <0.3 MG/DL (ref 0–30)
SARS-COV-2, NAAT: NOT DETECTED
SODIUM BLD-SCNC: 140 MMOL/L (ref 132–146)
SPECIFIC GRAVITY UA: >=1.03 (ref 1–1.03)
TOTAL PROTEIN: 6.9 G/DL (ref 6.4–8.3)
TRICYCLIC ANTIDEPRESSANTS SCREEN SERUM: NEGATIVE NG/ML
TROPONIN: <0.01 NG/ML (ref 0–0.03)
UROBILINOGEN, URINE: 0.2 E.U./DL
WBC # BLD: 8.2 E9/L (ref 4.5–11.5)
WBC UA: ABNORMAL /HPF (ref 0–5)

## 2020-12-11 PROCEDURE — G0480 DRUG TEST DEF 1-7 CLASSES: HCPCS

## 2020-12-11 PROCEDURE — 6360000002 HC RX W HCPCS: Performed by: EMERGENCY MEDICINE

## 2020-12-11 PROCEDURE — 83605 ASSAY OF LACTIC ACID: CPT

## 2020-12-11 PROCEDURE — 96365 THER/PROPH/DIAG IV INF INIT: CPT

## 2020-12-11 PROCEDURE — 85025 COMPLETE CBC W/AUTO DIFF WBC: CPT

## 2020-12-11 PROCEDURE — 6360000002 HC RX W HCPCS: Performed by: INTERNAL MEDICINE

## 2020-12-11 PROCEDURE — 84484 ASSAY OF TROPONIN QUANT: CPT

## 2020-12-11 PROCEDURE — 2580000003 HC RX 258: Performed by: INTERNAL MEDICINE

## 2020-12-11 PROCEDURE — 87040 BLOOD CULTURE FOR BACTERIA: CPT

## 2020-12-11 PROCEDURE — 80053 COMPREHEN METABOLIC PANEL: CPT

## 2020-12-11 PROCEDURE — 6370000000 HC RX 637 (ALT 250 FOR IP): Performed by: INTERNAL MEDICINE

## 2020-12-11 PROCEDURE — 80307 DRUG TEST PRSMV CHEM ANLYZR: CPT

## 2020-12-11 PROCEDURE — 6360000004 HC RX CONTRAST MEDICATION: Performed by: RADIOLOGY

## 2020-12-11 PROCEDURE — 99285 EMERGENCY DEPT VISIT HI MDM: CPT

## 2020-12-11 PROCEDURE — 72125 CT NECK SPINE W/O DYE: CPT

## 2020-12-11 PROCEDURE — 71275 CT ANGIOGRAPHY CHEST: CPT

## 2020-12-11 PROCEDURE — 93010 ELECTROCARDIOGRAM REPORT: CPT | Performed by: INTERNAL MEDICINE

## 2020-12-11 PROCEDURE — 70450 CT HEAD/BRAIN W/O DYE: CPT

## 2020-12-11 PROCEDURE — U0002 COVID-19 LAB TEST NON-CDC: HCPCS

## 2020-12-11 PROCEDURE — 83880 ASSAY OF NATRIURETIC PEPTIDE: CPT

## 2020-12-11 PROCEDURE — 87449 NOS EACH ORGANISM AG IA: CPT

## 2020-12-11 PROCEDURE — 74177 CT ABD & PELVIS W/CONTRAST: CPT

## 2020-12-11 PROCEDURE — 81001 URINALYSIS AUTO W/SCOPE: CPT

## 2020-12-11 PROCEDURE — 2580000003 HC RX 258: Performed by: EMERGENCY MEDICINE

## 2020-12-11 PROCEDURE — 6370000000 HC RX 637 (ALT 250 FOR IP): Performed by: EMERGENCY MEDICINE

## 2020-12-11 PROCEDURE — 71045 X-RAY EXAM CHEST 1 VIEW: CPT

## 2020-12-11 PROCEDURE — 93005 ELECTROCARDIOGRAM TRACING: CPT | Performed by: EMERGENCY MEDICINE

## 2020-12-11 PROCEDURE — 83690 ASSAY OF LIPASE: CPT

## 2020-12-11 PROCEDURE — 2060000000 HC ICU INTERMEDIATE R&B

## 2020-12-11 RX ORDER — SODIUM CHLORIDE 9 MG/ML
INJECTION, SOLUTION INTRAVENOUS CONTINUOUS
Status: ACTIVE | OUTPATIENT
Start: 2020-12-11 | End: 2020-12-12

## 2020-12-11 RX ORDER — LORAZEPAM 2 MG/ML
1 INJECTION INTRAMUSCULAR
Status: DISCONTINUED | OUTPATIENT
Start: 2020-12-11 | End: 2020-12-14 | Stop reason: HOSPADM

## 2020-12-11 RX ORDER — LORAZEPAM 1 MG/1
2 TABLET ORAL
Status: DISCONTINUED | OUTPATIENT
Start: 2020-12-11 | End: 2020-12-14 | Stop reason: HOSPADM

## 2020-12-11 RX ORDER — SODIUM CHLORIDE 0.9 % (FLUSH) 0.9 %
10 SYRINGE (ML) INJECTION PRN
Status: DISCONTINUED | OUTPATIENT
Start: 2020-12-11 | End: 2020-12-14 | Stop reason: HOSPADM

## 2020-12-11 RX ORDER — SODIUM CHLORIDE 0.9 % (FLUSH) 0.9 %
10 SYRINGE (ML) INJECTION EVERY 12 HOURS SCHEDULED
Status: DISCONTINUED | OUTPATIENT
Start: 2020-12-11 | End: 2020-12-14 | Stop reason: HOSPADM

## 2020-12-11 RX ORDER — ACETAMINOPHEN 650 MG/1
650 SUPPOSITORY RECTAL EVERY 6 HOURS PRN
Status: DISCONTINUED | OUTPATIENT
Start: 2020-12-11 | End: 2020-12-14 | Stop reason: HOSPADM

## 2020-12-11 RX ORDER — LORAZEPAM 2 MG/ML
3 INJECTION INTRAMUSCULAR
Status: DISCONTINUED | OUTPATIENT
Start: 2020-12-11 | End: 2020-12-14 | Stop reason: HOSPADM

## 2020-12-11 RX ORDER — LORAZEPAM 2 MG/ML
4 INJECTION INTRAMUSCULAR
Status: DISCONTINUED | OUTPATIENT
Start: 2020-12-11 | End: 2020-12-14 | Stop reason: HOSPADM

## 2020-12-11 RX ORDER — ATORVASTATIN CALCIUM 20 MG/1
20 TABLET, FILM COATED ORAL DAILY
Status: DISCONTINUED | OUTPATIENT
Start: 2020-12-11 | End: 2020-12-12

## 2020-12-11 RX ORDER — POLYETHYLENE GLYCOL 3350 17 G/17G
17 POWDER, FOR SOLUTION ORAL DAILY PRN
Status: DISCONTINUED | OUTPATIENT
Start: 2020-12-11 | End: 2020-12-14 | Stop reason: HOSPADM

## 2020-12-11 RX ORDER — LISINOPRIL 10 MG/1
10 TABLET ORAL DAILY
Status: DISCONTINUED | OUTPATIENT
Start: 2020-12-11 | End: 2020-12-13

## 2020-12-11 RX ORDER — PROMETHAZINE HYDROCHLORIDE 25 MG/1
12.5 TABLET ORAL EVERY 6 HOURS PRN
Status: DISCONTINUED | OUTPATIENT
Start: 2020-12-11 | End: 2020-12-14 | Stop reason: HOSPADM

## 2020-12-11 RX ORDER — ACETAMINOPHEN 325 MG/1
650 TABLET ORAL EVERY 6 HOURS PRN
Status: DISCONTINUED | OUTPATIENT
Start: 2020-12-11 | End: 2020-12-14 | Stop reason: HOSPADM

## 2020-12-11 RX ORDER — THIAMINE HYDROCHLORIDE 100 MG/ML
100 INJECTION, SOLUTION INTRAMUSCULAR; INTRAVENOUS DAILY
Status: DISCONTINUED | OUTPATIENT
Start: 2020-12-11 | End: 2020-12-14 | Stop reason: HOSPADM

## 2020-12-11 RX ORDER — LORAZEPAM 1 MG/1
4 TABLET ORAL
Status: DISCONTINUED | OUTPATIENT
Start: 2020-12-11 | End: 2020-12-14 | Stop reason: HOSPADM

## 2020-12-11 RX ORDER — MULTIVITAMIN WITH IRON
1 TABLET ORAL DAILY
Status: DISCONTINUED | OUTPATIENT
Start: 2020-12-11 | End: 2020-12-14 | Stop reason: HOSPADM

## 2020-12-11 RX ORDER — ACETAMINOPHEN 325 MG/1
650 TABLET ORAL ONCE
Status: COMPLETED | OUTPATIENT
Start: 2020-12-11 | End: 2020-12-11

## 2020-12-11 RX ORDER — LORAZEPAM 1 MG/1
1 TABLET ORAL
Status: DISCONTINUED | OUTPATIENT
Start: 2020-12-11 | End: 2020-12-14 | Stop reason: HOSPADM

## 2020-12-11 RX ORDER — AMLODIPINE BESYLATE 10 MG/1
10 TABLET ORAL DAILY
Status: DISCONTINUED | OUTPATIENT
Start: 2020-12-11 | End: 2020-12-14 | Stop reason: HOSPADM

## 2020-12-11 RX ORDER — ONDANSETRON 2 MG/ML
4 INJECTION INTRAMUSCULAR; INTRAVENOUS EVERY 6 HOURS PRN
Status: DISCONTINUED | OUTPATIENT
Start: 2020-12-11 | End: 2020-12-14 | Stop reason: HOSPADM

## 2020-12-11 RX ORDER — LORAZEPAM 2 MG/ML
2 INJECTION INTRAMUSCULAR
Status: DISCONTINUED | OUTPATIENT
Start: 2020-12-11 | End: 2020-12-14 | Stop reason: HOSPADM

## 2020-12-11 RX ORDER — FAMOTIDINE 20 MG/1
20 TABLET, FILM COATED ORAL 2 TIMES DAILY
Status: DISCONTINUED | OUTPATIENT
Start: 2020-12-11 | End: 2020-12-14 | Stop reason: HOSPADM

## 2020-12-11 RX ORDER — LORAZEPAM 1 MG/1
3 TABLET ORAL
Status: DISCONTINUED | OUTPATIENT
Start: 2020-12-11 | End: 2020-12-14 | Stop reason: HOSPADM

## 2020-12-11 RX ADMIN — AMPICILLIN SODIUM AND SULBACTAM SODIUM 3 G: 2; 1 INJECTION, POWDER, FOR SOLUTION INTRAMUSCULAR; INTRAVENOUS at 15:58

## 2020-12-11 RX ADMIN — ENOXAPARIN SODIUM 40 MG: 40 INJECTION SUBCUTANEOUS at 15:57

## 2020-12-11 RX ADMIN — LISINOPRIL 10 MG: 10 TABLET ORAL at 10:08

## 2020-12-11 RX ADMIN — LORAZEPAM: 2 INJECTION INTRAMUSCULAR; INTRAVENOUS at 15:57

## 2020-12-11 RX ADMIN — SODIUM CHLORIDE: 9 INJECTION, SOLUTION INTRAVENOUS at 10:13

## 2020-12-11 RX ADMIN — ACETAMINOPHEN 650 MG: 325 TABLET ORAL at 19:27

## 2020-12-11 RX ADMIN — SODIUM CHLORIDE 3 G: 9 INJECTION, SOLUTION INTRAVENOUS at 07:12

## 2020-12-11 RX ADMIN — IOPAMIDOL 90 ML: 755 INJECTION, SOLUTION INTRAVENOUS at 05:22

## 2020-12-11 RX ADMIN — AMLODIPINE BESYLATE 10 MG: 10 TABLET ORAL at 10:08

## 2020-12-11 RX ADMIN — THIAMINE HYDROCHLORIDE: 100 INJECTION, SOLUTION INTRAMUSCULAR; INTRAVENOUS at 15:57

## 2020-12-11 RX ADMIN — SODIUM CHLORIDE: 9 INJECTION, SOLUTION INTRAVENOUS at 03:30

## 2020-12-11 RX ADMIN — ACETAMINOPHEN 650 MG: 325 TABLET ORAL at 10:07

## 2020-12-11 RX ADMIN — ACETAMINOPHEN 650 MG: 325 TABLET ORAL at 14:50

## 2020-12-11 RX ADMIN — FAMOTIDINE 20 MG: 20 TABLET, FILM COATED ORAL at 10:07

## 2020-12-11 ASSESSMENT — PAIN SCALES - GENERAL
PAINLEVEL_OUTOF10: 10
PAINLEVEL_OUTOF10: 7

## 2020-12-11 ASSESSMENT — PAIN DESCRIPTION - ORIENTATION: ORIENTATION: POSTERIOR

## 2020-12-11 ASSESSMENT — PAIN DESCRIPTION - LOCATION: LOCATION: HEAD

## 2020-12-11 NOTE — ED PROVIDER NOTES
HPI:  12/11/20, Time: 2:41 AM JEANNIE Doe is a 36 y.o. male presenting to the ED for history of falls, beginning hours ago. The complaint has been persistent, moderate in severity, and worsened by nothing. Patient reporting multiple falls at home. Patient had been drinking. Patient reporting no chest pain or difficulty breathing EMS reports that his pulse ox was in the 80s on arrival.  Patient reporting left lower abdominal pain he has had for quite some time. Patient reporting no vomiting or diarrhea reports some mild cough he reports no leg pain or swelling. Reports no numbness or tingling. Patient reporting no fever or chills. He does report headache. He reports that his headache is from falling and hitting his head. ROS:   Pertinent positives and negatives are stated within HPI, all other systems reviewed and are negative.  --------------------------------------------- PAST HISTORY ---------------------------------------------  Past Medical History:  has no past medical history on file. Past Surgical History:  has no past surgical history on file. Social History:  reports that he has been smoking cigarettes. He has been smoking about 1.00 pack per day. He has never used smokeless tobacco. He reports current alcohol use. He reports current drug use. Drugs: IV and Cocaine. Family History: family history is not on file. The patients home medications have been reviewed. Allergies: Patient has no known allergies. ---------------------------------------------------PHYSICAL EXAM--------------------------------------    Constitutional/General: Alert and oriented x3,   Head: Normocephalic, tenderness to occipital scalp  Eyes: PERRL, EOMI  Mouth: Oropharynx clear, handling secretions, no trismus  Neck: C-collar in place  Pulmonary: Lungs clear to auscultation bilaterally, no wheezes, rales, or rhonchi. Not in respiratory distress  Cardiovascular:  Regular rate.  Regular rhythm. No murmurs, gallops, or rubs. 2+ distal pulses  Chest: no chest wall tenderness  Abdomen: Soft. Tender left lower abdomen Non distended. +BS. No rebound, guarding, or rigidity. No pulsatile masses appreciated. Musculoskeletal: Moves all extremities x 4. Warm and well perfused, no clubbing, cyanosis, or edema. Capillary refill <3 seconds  Skin: warm and dry. No rashes. Neurologic: GCS 15, CN 2-12 grossly intact, no focal deficits, symmetric strength 5/5 in the upper and lower extremities bilaterally  Psych: Normal Affect    -------------------------------------------------- RESULTS -------------------------------------------------  I have personally reviewed all laboratory and imaging results for this patient. Results are listed below.      LABS:  Results for orders placed or performed during the hospital encounter of 12/11/20   CBC auto differential   Result Value Ref Range    WBC 8.2 4.5 - 11.5 E9/L    RBC 4.42 3.80 - 5.80 E12/L    Hemoglobin 14.4 12.5 - 16.5 g/dL    Hematocrit 43.0 37.0 - 54.0 %    MCV 97.3 80.0 - 99.9 fL    MCH 32.6 26.0 - 35.0 pg    MCHC 33.5 32.0 - 34.5 %    RDW 13.0 11.5 - 15.0 fL    Platelets 964 705 - 813 E9/L    MPV 10.6 7.0 - 12.0 fL    Neutrophils % 64.2 43.0 - 80.0 %    Immature Granulocytes % 0.4 0.0 - 5.0 %    Lymphocytes % 29.3 20.0 - 42.0 %    Monocytes % 5.5 2.0 - 12.0 %    Eosinophils % 0.2 0.0 - 6.0 %    Basophils % 0.4 0.0 - 2.0 %    Neutrophils Absolute 5.26 1.80 - 7.30 E9/L    Immature Granulocytes # 0.03 E9/L    Lymphocytes Absolute 2.40 1.50 - 4.00 E9/L    Monocytes Absolute 0.45 0.10 - 0.95 E9/L    Eosinophils Absolute 0.02 (L) 0.05 - 0.50 E9/L    Basophils Absolute 0.03 0.00 - 0.20 E9/L   Comprehensive Metabolic Panel   Result Value Ref Range    Sodium 140 132 - 146 mmol/L    Potassium 4.3 3.5 - 5.0 mmol/L    Chloride 105 98 - 107 mmol/L    CO2 22 22 - 29 mmol/L    Anion Gap 13 7 - 16 mmol/L    Glucose 119 (H) 74 - 99 mg/dL    BUN 9 6 - 20 mg/dL    CREATININE 1.0 0.7 - 1.2 mg/dL    GFR Non-African American >60 >=60 mL/min/1.73    GFR African American >60     Calcium 8.4 (L) 8.6 - 10.2 mg/dL    Total Protein 6.9 6.4 - 8.3 g/dL    Alb 4.0 3.5 - 5.2 g/dL    Total Bilirubin <0.2 0.0 - 1.2 mg/dL    Alkaline Phosphatase 63 40 - 129 U/L    ALT 47 (H) 0 - 40 U/L    AST 44 (H) 0 - 39 U/L   Troponin   Result Value Ref Range    Troponin <0.01 0.00 - 0.03 ng/mL   Serum Drug Screen   Result Value Ref Range    Ethanol Lvl 262 mg/dL    Acetaminophen Level <5.0 (L) 10.0 - 44.4 mcg/mL    Salicylate, Serum <8.0 0.0 - 30.0 mg/dL    TCA Scrn NEGATIVE Cutoff:300 ng/mL   Urine Drug Screen   Result Value Ref Range    Amphetamine Screen, Urine NOT DETECTED Negative <1000 ng/mL    Barbiturate Screen, Ur NOT DETECTED Negative < 200 ng/mL    Benzodiazepine Screen, Urine NOT DETECTED Negative < 200 ng/mL    Cannabinoid Scrn, Ur POSITIVE (A) Negative < 50ng/mL    Cocaine Metabolite Screen, Urine POSITIVE (A) Negative < 300 ng/mL    Opiate Scrn, Ur NOT DETECTED Negative < 300ng/mL    PCP Screen, Urine NOT DETECTED Negative < 25 ng/mL    Methadone Screen, Urine NOT DETECTED Negative <300 ng/mL    Oxycodone Urine NOT DETECTED Negative <100 ng/mL    FENTANYL SCREEN, URINE NOT DETECTED Negative <1 ng/mL    Drug Screen Comment: see below    Lipase   Result Value Ref Range    Lipase 59 13 - 60 U/L   Lactic Acid, Plasma   Result Value Ref Range    Lactic Acid 3.3 (H) 0.5 - 2.2 mmol/L   Urinalysis   Result Value Ref Range    Color, UA Yellow Straw/Yellow    Clarity, UA Clear Clear    Glucose, Ur Negative Negative mg/dL    Bilirubin Urine Negative Negative    Ketones, Urine Negative Negative mg/dL    Specific Gravity, UA >=1.030 1.005 - 1.030    Blood, Urine TRACE-INTACT Negative    pH, UA 5.5 5.0 - 9.0    Protein, UA 30 (A) Negative mg/dL    Urobilinogen, Urine 0.2 <2.0 E.U./dL    Nitrite, Urine Negative Negative    Leukocyte Esterase, Urine Negative Negative   Microscopic Urinalysis   Result Value Ref Range WBC, UA 0-1 0 - 5 /HPF    RBC, UA NONE 0 - 2 /HPF    Bacteria, UA RARE (A) None Seen /HPF       RADIOLOGY:  Interpreted by Radiologist.  [facility]   Final Result   1. No acute intracranial abnormality nor acute calvarial fracture. 2. Suspected subcutaneous contusion in the occipital scalp. CT CERVICAL SPINE WO CONTRAST   Final Result   No definite fracture on a limited study. CT ABDOMEN PELVIS W IV CONTRAST Additional Contrast? None   Final Result   1. No scan evidence for pulmonary embolus. 2. Ill-defined nodular pulmonary opacities bilaterally are likely infectious   or inflammatory, including the possibility of COVID related pneumonia. 3. No traumatic abnormality in the chest, abdomen or pelvis. CTA CHEST W CONTRAST   Final Result   1. No scan evidence for pulmonary embolus. 2. Ill-defined nodular pulmonary opacities bilaterally are likely infectious   or inflammatory, including the possibility of COVID related pneumonia. 3. No traumatic abnormality in the chest, abdomen or pelvis. XR CHEST PORTABLE   Final Result   Left-sided pulmonary edema, pneumonia or aspiration. EKG:  This EKG is signed and interpreted by me. Rate: 107  Rhythm: sinus tachycardia  Interpretation: non-specific EKG  Comparison: stable as compared to patient's most recent EKG      ------------------------- NURSING NOTES AND VITALS REVIEWED ---------------------------   The nursing notes within the ED encounter and vital signs as below have been reviewed by myself. BP (!) 127/95   Pulse 102   Temp 95.7 °F (35.4 °C)   Resp 20   Ht 5' 5\" (1.651 m)   Wt 180 lb (81.6 kg)   SpO2 100%   BMI 29.95 kg/m²   Oxygen Saturation Interpretation: Normal    The patients available past medical records and past encounters were reviewed.         ------------------------------ ED COURSE/MEDICAL DECISION MAKING----------------------  Medications   0.9 % sodium chloride infusion ( Intravenous New Bag 12/11/20 0330)   ampicillin-sulbactam (UNASYN) 3 g ivpb minibag (has no administration in time range)   iopamidol (ISOVUE-370) 76 % injection 90 mL (90 mLs Intravenous Given 12/11/20 8350)             Medical Decision Making:   Presenting here because of fall. Patient also noted be intoxicated. Patient per report pulse ox is in the 80s at home. Patient chest x-ray shows infiltrate labs and CTs noted. CT of the chest was ordered due to his hypoxia and CT does appear to show infiltrates and pneumonia. Patient was treated with fluids as well as given IV antibiotics. Patient plan will be to be admitted to hospital.     Re-Evaluations:             Re-evaluation. Patients symptoms show no change  Patient reevaluated pulse ox on room air currently in the 90s. Patient in no respiratory distress. Patient still reporting headache. Patient reporting no chest pain he still reports left lower abdominal pain. Patient made aware of plan for admission    Consultations:             IM    Critical Care: This patient's ED course included: a personal history and physicial eaxmination    This patient has been closely monitored during their ED course. Counseling: The emergency provider has spoken with the patient and discussed todays results, in addition to providing specific details for the plan of care and counseling regarding the diagnosis and prognosis. Questions are answered at this time and they are agreeable with the plan.       --------------------------------- IMPRESSION AND DISPOSITION ---------------------------------    IMPRESSION  1. Injury of head, initial encounter    2. Acute alcoholic intoxication without complication ([facility]    3. Substance abuse ([facility] [address]    4. Left lower quadrant abdominal pain    5. Pneumonia due to organism        DISPOSITION  Disposition: Admit to monitored bed  Patient condition is stable        NOTE: This report was transcribed using voice recognition software.  Every effort was made to ensure accuracy; however, inadvertent computerized transcription errors may be present                 Stephen Geiger MD  12/11/20 4941

## 2020-12-11 NOTE — ED NOTES
Spoke to pt mother Allana Lesch regarding patient being admitted to hospital. Pt mother is insisting on picking up patient and that patient will sign out AMA. Pt has not expressed signing out AMA to this RN since placed in room 34. Pt c/o headache and jaw pain. Pt mother Allana Lesch insisting on speaking to patient but this RN explained that we do not have portable phones available at this time for patients to use. Allana Lesch wanted this RN to go to patient and ask him if he wants to sign out AMA so that they can come and get him. This RN explained to Allana Lesch that patient has not asked to leave and that it is not up to them to come and  patient without him being discharged. Pt VSS. Will continue to monitor.      Мария Geiger RN  12/11/20 3358

## 2020-12-11 NOTE — CARE COORDINATION
Social Work /Discharge Planning:    Pt presents to the ED secondary to a fall at home. Pt was also intoxicated and admits to using cocaine and marijuana. SW consult noted. Pt stated he fell at home last evening and admits to drinking alcohol, smoking marijuana and using cocaine. Pt unable to answer further questions as he stated he had a horrible headache and that he could barely move his head. RN notified.

## 2020-12-11 NOTE — H&P
Hospitalist History & Physical      PCP: No primary care provider on file. Date of Admission: 12/11/2020    Date of Service: Pt seen/examined on 12/11/2020 and is admitted to Inpatient with expected LOS greater than two midnights due to medical therapy. Chief Complaint:  had concerns including Fall ((son heard patient fall x2, patient c/o headache, hematoma on back of head, +ETOH, +cocaine)). History Of Present Illness:    Mr. Daisha Khan, a 36y.o. year old male  who  has a past medical history of ETOH abuse, GERD (gastroesophageal reflux disease), HLD (hyperlipidemia), HTN (hypertension), Polysubstance abuse ([facility] [address] and Tobacco abuse. Patient presents to the ED for history of multiple falls at home. The patient was intoxicated on arrival.  He denies any chest pain, shortness of breath, vomiting, diarrhea, fever or chills, or leg pain or swelling. EMS reports that his SPO2 was in the 80s on room air. He admits to left lower abdominal pain that he has had for some time and a mild cough. He also admits to hitting his head during his last fall and has a residual headache. ER COURSE:  In ER he was found to be clearly intoxicated with a hematoma to the occipital scalp. He was placed in a c-collar which he removed several times. His work-up was relatively unremarkable aside from a lactic acid of 3.3, mild elevation in ALT/AST, and a UDS positive for cannabinoids and cocaine. CT of the head and cervical spine with no acute abnormalities. Chest x-ray with evidence of left-sided pulmonary edema, pneumonia, or aspiration and CT of the chest with ill-defined nodular pulmonary opacities bilaterally. Patient was tested for Covid but was negative. Patient was given fluid and Unasyn. Blood cultures were drawn and pending.     Past Medical History:        Diagnosis Date    ETOH abuse     GERD (gastroesophageal reflux disease)     HLD (hyperlipidemia)     HTN (hypertension)     Polysubstance abuse ([facility] [address]     Tobacco abuse        Past Surgical History:    History reviewed. No pertinent surgical history. Medications Prior to Admission:      Prior to Admission medications    Medication Sig Start Date End Date Taking? Authorizing Provider   atorvastatin (LIPITOR) 20 MG tablet Take 20 mg by mouth daily    Historical Provider, MD   lisinopril (PRINIVIL;ZESTRIL) 10 MG tablet Take 10 mg by mouth daily    Historical Provider, MD   hydroCHLOROthiazide (HYDRODIURIL) 25 MG tablet Take 25 mg by mouth daily    Historical Provider, MD   famotidine (PEPCID) 20 MG tablet Take 20 mg by mouth 2 times daily    Historical Provider, MD   amLODIPine (NORVASC) 10 MG tablet Take 10 mg by mouth daily    Historical Provider, MD       Allergies:  Patient has no known allergies. Social History:    RESIDENCE: Home alone  TOBACCO:   reports that he has been smoking cigarettes. He has been smoking about 1.00 pack per day. He has never used smokeless tobacco.  ETOH:   reports current alcohol use. Family History:    Reviewed in detail and negative for DM, CAD, Cancer, CVA. REVIEW OF SYSTEMS:   Pertinent positives as noted in the HPI. All other systems reviewed and negative. PHYSICAL EXAM:  BP (!) 127/95   Pulse 102   Temp 95.7 °F (35.4 °C)   Resp 20   Ht 5' 5\" (1.651 m)   Wt 180 lb (81.6 kg)   SpO2 100%   BMI 29.95 kg/m²      General appearance: Lethargic, in no apparent distress, appears stated age, and cooperative. HEENT: Normal cephalic, atraumatic without obvious deformity. Pupils equal, round, and reactive to light. Extra ocular muscles intact. Conjunctivae/corneas clear. Neck: Supple, with full range of motion. No jugular venous distention. Trachea midline. Respiratory:  Clear to auscultation bilaterally. No apparent distress. Cardiovascular:  Regular rate and rhythm. S1, S2 without murmurs, rubs, or gallops. PV: Brisk capillary refill. +2 pedal and radial pulses bilaterally.  No clubbing, cyanosis, edema of bilateral lower extremities. Abdomen: Soft, non-tender, non-distended. +BS  Musculoskeletal: No obvious deformities or erythematous or edematous joints. Skin: Normal skin color. No rashes or lesions. Neurologic:  Neurovascularly intact without any focal sensory/motor deficits. Cranial nerves: II-XII intact, grossly non-focal.  Psychiatric: Lethargic, uncooperative     Reviewed EKG and CXR personally      CBC:   Recent Labs     12/11/20 0309   WBC 8.2   RBC 4.42   HGB 14.4   HCT 43.0   MCV 97.3   RDW 13.0        BMP:   Recent Labs     12/11/20 0309      K 4.3      CO2 22   BUN 9   CREATININE 1.0     LFT:  Recent Labs     12/11/20 0309   PROT 6.9   ALKPHOS 63   ALT 47*   AST 44*   BILITOT <0.2   LIPASE 59     CE:  Recent Labs     12/11/20 0309   TROPONINI <0.01     Lactic Acid:   Lab Results   Component Value Date    LACTA 3.3 (H) 12/11/2020       Urinalysis:    Lab Results   Component Value Date    NITRU Negative 12/11/2020    WBCUA 0-1 12/11/2020    BACTERIA RARE 12/11/2020    RBCUA NONE 12/11/2020    BLOODU TRACE-INTACT 12/11/2020    SPECGRAV >=1.030 12/11/2020    GLUCOSEU Negative 12/11/2020       Imaging:  Ct Head Wo Contrast    Result Date: 12/11/2020  1. No acute intracranial abnormality nor acute calvarial fracture. 2. Suspected subcutaneous contusion in the occipital scalp. Ct Cervical Spine Wo Contrast    Result Date: 12/11/2020  No definite fracture on a limited study. Ct Abdomen Pelvis W Iv Contrast Additional Contrast? None    Result Date: 12/11/2020  1. No scan evidence for pulmonary embolus. 2. Ill-defined nodular pulmonary opacities bilaterally are likely infectious or inflammatory, including the possibility of COVID related pneumonia. 3. No traumatic abnormality in the chest, abdomen or pelvis. Xr Chest Portable    Result Date: 12/11/2020  Left-sided pulmonary edema, pneumonia or aspiration. Cta Chest W Contrast    Result Date: 12/11/2020  1.  No scan evidence for pulmonary embolus. 2. Ill-defined nodular pulmonary opacities bilaterally are likely infectious or inflammatory, including the possibility of COVID related pneumonia. 3. No traumatic abnormality in the chest, abdomen or pelvis. ASSESSMENT:    Principal Problem:    Acute hypoxemic respiratory failure (HCC)  Active Problems:    Overdose of illicit drug (HCC)    Polysubstance abuse (HCC)    Alcohol abuse    HTN (hypertension)    HLD (hyperlipidemia)    GERD (gastroesophageal reflux disease)    Tobacco abuse    Alcohol intoxication delirium ([address]    Pneumonia  Resolved Problems:    * No resolved hospital problems. *      PLAN:     Admit to general, vital signs every 8 hours  Initiate CIWA protocol  Resume home meds  Unasyn 1.5 g every 6 hours  CBC, CMP daily  Recheck lactic  N.p.o. until swallow eval  Lovenox for DVT prophylaxis  Pepcid twice daily  Consult social work  Urine strep  Thiamine and multivitamin supplements      Diet: No diet orders on file  Code Status: Prior  Surrogate decision maker confirmed with patient:   Extended Emergency Contact Information  Primary Emergency Contact: Ming Valentino [place] [address] Phone: [phone]  Mobile Phone: [phone]  Relation: Child  Secondary Emergency Contact: Nasir [address], [address] Phone: [phone]  Relation: Parent    DVT Prophylaxis: [x]Lovenox []Heparin []PCD [] [address] []Encouraged ambulation  Disposition: [x]Med/Surg [] Intermediate [] ICU/CCU  Admit status: [] Observation [x] Inpatient     +++++++++++++++++++++++++++++++++++++++++++++++++  Real Oates, [address] [place] [address]  +++++++++++++++++++++++++++++++++++++++++++++++++  NOTE: This report was transcribed using voice recognition software. Every effort was made to ensure accuracy; however, inadvertent computerized transcription errors may be present.

## 2020-12-12 PROBLEM — R74.01 TRANSAMINITIS: Status: ACTIVE | Noted: 2020-12-12

## 2020-12-12 PROBLEM — R74.01 TRANSAMINITIS: Status: RESOLVED | Noted: 2020-12-12 | Resolved: 2020-12-12

## 2020-12-12 PROBLEM — J96.01 ACUTE HYPOXEMIC RESPIRATORY FAILURE (HCC): Status: RESOLVED | Noted: 2020-10-28 | Resolved: 2020-12-12

## 2020-12-12 PROBLEM — E87.20 LACTIC ACIDOSIS: Status: ACTIVE | Noted: 2020-12-12

## 2020-12-12 PROBLEM — R51.9 CEPHALGIA: Status: ACTIVE | Noted: 2020-12-12

## 2020-12-12 LAB
ALBUMIN SERPL-MCNC: 3.9 G/DL (ref 3.5–5.2)
ALP BLD-CCNC: 63 U/L (ref 40–129)
ALT SERPL-CCNC: 26 U/L (ref 0–40)
ANION GAP SERPL CALCULATED.3IONS-SCNC: 12 MMOL/L (ref 7–16)
AST SERPL-CCNC: 13 U/L (ref 0–39)
BILIRUB SERPL-MCNC: 0.5 MG/DL (ref 0–1.2)
BUN BLDV-MCNC: 9 MG/DL (ref 6–20)
CALCIUM SERPL-MCNC: 8.8 MG/DL (ref 8.6–10.2)
CHLORIDE BLD-SCNC: 104 MMOL/L (ref 98–107)
CHOLESTEROL, TOTAL: 159 MG/DL (ref 0–199)
CO2: 24 MMOL/L (ref 22–29)
CREAT SERPL-MCNC: 0.9 MG/DL (ref 0.7–1.2)
GFR AFRICAN AMERICAN: >60
GFR NON-AFRICAN AMERICAN: >60 ML/MIN/1.73
GLUCOSE BLD-MCNC: 90 MG/DL (ref 74–99)
HCT VFR BLD CALC: 41.6 % (ref 37–54)
HDLC SERPL-MCNC: 42 MG/DL
HEMOGLOBIN: 14.3 G/DL (ref 12.5–16.5)
INR BLD: 1
LACTIC ACID: 1 MMOL/L (ref 0.5–2.2)
LDL CHOLESTEROL CALCULATED: 68 MG/DL (ref 0–99)
MAGNESIUM: 1.5 MG/DL (ref 1.6–2.6)
MCH RBC QN AUTO: 32.9 PG (ref 26–35)
MCHC RBC AUTO-ENTMCNC: 34.4 % (ref 32–34.5)
MCV RBC AUTO: 95.6 FL (ref 80–99.9)
PDW BLD-RTO: 12.9 FL (ref 11.5–15)
PLATELET # BLD: 328 E9/L (ref 130–450)
PMV BLD AUTO: 10.8 FL (ref 7–12)
POTASSIUM REFLEX MAGNESIUM: 3.5 MMOL/L (ref 3.5–5)
PROTHROMBIN TIME: 11.7 SEC (ref 9.3–12.4)
RBC # BLD: 4.35 E12/L (ref 3.8–5.8)
SODIUM BLD-SCNC: 140 MMOL/L (ref 132–146)
STREP PNEUMONIAE ANTIGEN, URINE: NORMAL
TOTAL PROTEIN: 6.7 G/DL (ref 6.4–8.3)
TRIGL SERPL-MCNC: 244 MG/DL (ref 0–149)
VLDLC SERPL CALC-MCNC: 49 MG/DL
WBC # BLD: 18.5 E9/L (ref 4.5–11.5)

## 2020-12-12 PROCEDURE — 6370000000 HC RX 637 (ALT 250 FOR IP): Performed by: INTERNAL MEDICINE

## 2020-12-12 PROCEDURE — 2580000003 HC RX 258: Performed by: INTERNAL MEDICINE

## 2020-12-12 PROCEDURE — 83735 ASSAY OF MAGNESIUM: CPT

## 2020-12-12 PROCEDURE — 83605 ASSAY OF LACTIC ACID: CPT

## 2020-12-12 PROCEDURE — 85027 COMPLETE CBC AUTOMATED: CPT

## 2020-12-12 PROCEDURE — 6360000002 HC RX W HCPCS: Performed by: INTERNAL MEDICINE

## 2020-12-12 PROCEDURE — 6370000000 HC RX 637 (ALT 250 FOR IP): Performed by: NURSE PRACTITIONER

## 2020-12-12 PROCEDURE — 80061 LIPID PANEL: CPT

## 2020-12-12 PROCEDURE — 6370000000 HC RX 637 (ALT 250 FOR IP): Performed by: FAMILY MEDICINE

## 2020-12-12 PROCEDURE — 36415 COLL VENOUS BLD VENIPUNCTURE: CPT

## 2020-12-12 PROCEDURE — 1200000000 HC SEMI PRIVATE

## 2020-12-12 PROCEDURE — 2580000003 HC RX 258: Performed by: EMERGENCY MEDICINE

## 2020-12-12 PROCEDURE — 85610 PROTHROMBIN TIME: CPT

## 2020-12-12 PROCEDURE — 80053 COMPREHEN METABOLIC PANEL: CPT

## 2020-12-12 RX ORDER — IBUPROFEN 400 MG/1
800 TABLET ORAL ONCE
Status: COMPLETED | OUTPATIENT
Start: 2020-12-12 | End: 2020-12-12

## 2020-12-12 RX ORDER — ACETAMINOPHEN 500 MG
1000 TABLET ORAL ONCE
Status: COMPLETED | OUTPATIENT
Start: 2020-12-12 | End: 2020-12-12

## 2020-12-12 RX ORDER — IBUPROFEN 400 MG/1
600 TABLET ORAL EVERY 6 HOURS PRN
Status: DISCONTINUED | OUTPATIENT
Start: 2020-12-12 | End: 2020-12-14 | Stop reason: HOSPADM

## 2020-12-12 RX ORDER — ATORVASTATIN CALCIUM 40 MG/1
40 TABLET, FILM COATED ORAL DAILY
Status: DISCONTINUED | OUTPATIENT
Start: 2020-12-13 | End: 2020-12-14 | Stop reason: HOSPADM

## 2020-12-12 RX ADMIN — AMPICILLIN SODIUM AND SULBACTAM SODIUM 3 G: 2; 1 INJECTION, POWDER, FOR SOLUTION INTRAMUSCULAR; INTRAVENOUS at 06:16

## 2020-12-12 RX ADMIN — IBUPROFEN 600 MG: 400 TABLET, FILM COATED ORAL at 20:13

## 2020-12-12 RX ADMIN — LISINOPRIL 10 MG: 10 TABLET ORAL at 09:27

## 2020-12-12 RX ADMIN — FAMOTIDINE 20 MG: 20 TABLET, FILM COATED ORAL at 23:13

## 2020-12-12 RX ADMIN — ENOXAPARIN SODIUM 40 MG: 40 INJECTION SUBCUTANEOUS at 09:31

## 2020-12-12 RX ADMIN — AMLODIPINE BESYLATE 10 MG: 10 TABLET ORAL at 09:41

## 2020-12-12 RX ADMIN — ACETAMINOPHEN 650 MG: 325 TABLET ORAL at 00:35

## 2020-12-12 RX ADMIN — ACETAMINOPHEN 1000 MG: 500 TABLET ORAL at 06:11

## 2020-12-12 RX ADMIN — AMPICILLIN SODIUM AND SULBACTAM SODIUM 3 G: 2; 1 INJECTION, POWDER, FOR SOLUTION INTRAMUSCULAR; INTRAVENOUS at 23:14

## 2020-12-12 RX ADMIN — FAMOTIDINE 20 MG: 20 TABLET, FILM COATED ORAL at 09:27

## 2020-12-12 RX ADMIN — AMPICILLIN SODIUM AND SULBACTAM SODIUM 3 G: 2; 1 INJECTION, POWDER, FOR SOLUTION INTRAMUSCULAR; INTRAVENOUS at 09:27

## 2020-12-12 RX ADMIN — IBUPROFEN 800 MG: 400 TABLET, FILM COATED ORAL at 09:40

## 2020-12-12 RX ADMIN — SODIUM CHLORIDE: 9 INJECTION, SOLUTION INTRAVENOUS at 00:43

## 2020-12-12 RX ADMIN — AMPICILLIN SODIUM AND SULBACTAM SODIUM 3 G: 2; 1 INJECTION, POWDER, FOR SOLUTION INTRAMUSCULAR; INTRAVENOUS at 00:35

## 2020-12-12 RX ADMIN — ACETAMINOPHEN 650 MG: 325 TABLET ORAL at 16:33

## 2020-12-12 RX ADMIN — AMPICILLIN SODIUM AND SULBACTAM SODIUM 3 G: 2; 1 INJECTION, POWDER, FOR SOLUTION INTRAMUSCULAR; INTRAVENOUS at 16:33

## 2020-12-12 RX ADMIN — Medication 10 ML: at 00:41

## 2020-12-12 RX ADMIN — ATORVASTATIN CALCIUM 20 MG: 20 TABLET, FILM COATED ORAL at 09:41

## 2020-12-12 RX ADMIN — FAMOTIDINE 20 MG: 20 TABLET, FILM COATED ORAL at 00:36

## 2020-12-12 RX ADMIN — ACETAMINOPHEN 650 MG: 325 TABLET ORAL at 22:17

## 2020-12-12 RX ADMIN — THIAMINE HYDROCHLORIDE 100 MG: 100 INJECTION, SOLUTION INTRAMUSCULAR; INTRAVENOUS at 09:27

## 2020-12-12 ASSESSMENT — PAIN SCALES - GENERAL
PAINLEVEL_OUTOF10: 10
PAINLEVEL_OUTOF10: 8
PAINLEVEL_OUTOF10: 8
PAINLEVEL_OUTOF10: 7
PAINLEVEL_OUTOF10: 6
PAINLEVEL_OUTOF10: 7
PAINLEVEL_OUTOF10: 4
PAINLEVEL_OUTOF10: 10
PAINLEVEL_OUTOF10: 8
PAINLEVEL_OUTOF10: 7

## 2020-12-12 ASSESSMENT — PAIN DESCRIPTION - FREQUENCY
FREQUENCY: CONTINUOUS
FREQUENCY: CONTINUOUS

## 2020-12-12 ASSESSMENT — PAIN DESCRIPTION - PAIN TYPE
TYPE: ACUTE PAIN
TYPE: ACUTE PAIN

## 2020-12-12 ASSESSMENT — PAIN DESCRIPTION - ONSET
ONSET: ON-GOING
ONSET: ON-GOING

## 2020-12-12 ASSESSMENT — PAIN DESCRIPTION - DESCRIPTORS
DESCRIPTORS: ACHING
DESCRIPTORS: ACHING

## 2020-12-12 ASSESSMENT — PAIN DESCRIPTION - ORIENTATION
ORIENTATION: POSTERIOR
ORIENTATION: POSTERIOR

## 2020-12-12 ASSESSMENT — PAIN DESCRIPTION - LOCATION
LOCATION: HEAD
LOCATION: HEAD

## 2020-12-13 LAB
ANION GAP SERPL CALCULATED.3IONS-SCNC: 9 MMOL/L (ref 7–16)
BASOPHILS ABSOLUTE: 0.02 E9/L (ref 0–0.2)
BASOPHILS RELATIVE PERCENT: 0.2 % (ref 0–2)
BUN BLDV-MCNC: 9 MG/DL (ref 6–20)
CALCIUM SERPL-MCNC: 9.4 MG/DL (ref 8.6–10.2)
CHLORIDE BLD-SCNC: 101 MMOL/L (ref 98–107)
CO2: 27 MMOL/L (ref 22–29)
CREAT SERPL-MCNC: 0.9 MG/DL (ref 0.7–1.2)
EOSINOPHILS ABSOLUTE: 0.07 E9/L (ref 0.05–0.5)
EOSINOPHILS RELATIVE PERCENT: 0.6 % (ref 0–6)
GFR AFRICAN AMERICAN: >60
GFR NON-AFRICAN AMERICAN: >60 ML/MIN/1.73
GLUCOSE BLD-MCNC: 95 MG/DL (ref 74–99)
HCT VFR BLD CALC: 46.1 % (ref 37–54)
HEMOGLOBIN: 15.1 G/DL (ref 12.5–16.5)
IMMATURE GRANULOCYTES #: 0.04 E9/L
IMMATURE GRANULOCYTES %: 0.3 % (ref 0–5)
LYMPHOCYTES ABSOLUTE: 2.56 E9/L (ref 1.5–4)
LYMPHOCYTES RELATIVE PERCENT: 21.9 % (ref 20–42)
MAGNESIUM: 1.8 MG/DL (ref 1.6–2.6)
MCH RBC QN AUTO: 31.9 PG (ref 26–35)
MCHC RBC AUTO-ENTMCNC: 32.8 % (ref 32–34.5)
MCV RBC AUTO: 97.3 FL (ref 80–99.9)
MONOCYTES ABSOLUTE: 0.71 E9/L (ref 0.1–0.95)
MONOCYTES RELATIVE PERCENT: 6.1 % (ref 2–12)
NEUTROPHILS ABSOLUTE: 8.28 E9/L (ref 1.8–7.3)
NEUTROPHILS RELATIVE PERCENT: 70.9 % (ref 43–80)
PDW BLD-RTO: 12.7 FL (ref 11.5–15)
PHOSPHORUS: 2.9 MG/DL (ref 2.5–4.5)
PLATELET # BLD: 320 E9/L (ref 130–450)
PMV BLD AUTO: 10.9 FL (ref 7–12)
POTASSIUM SERPL-SCNC: 3.9 MMOL/L (ref 3.5–5)
RBC # BLD: 4.74 E12/L (ref 3.8–5.8)
SODIUM BLD-SCNC: 137 MMOL/L (ref 132–146)
WBC # BLD: 11.7 E9/L (ref 4.5–11.5)

## 2020-12-13 PROCEDURE — 80048 BASIC METABOLIC PNL TOTAL CA: CPT

## 2020-12-13 PROCEDURE — 6370000000 HC RX 637 (ALT 250 FOR IP): Performed by: INTERNAL MEDICINE

## 2020-12-13 PROCEDURE — 36415 COLL VENOUS BLD VENIPUNCTURE: CPT

## 2020-12-13 PROCEDURE — 6360000002 HC RX W HCPCS: Performed by: INTERNAL MEDICINE

## 2020-12-13 PROCEDURE — 6370000000 HC RX 637 (ALT 250 FOR IP): Performed by: NURSE PRACTITIONER

## 2020-12-13 PROCEDURE — 2580000003 HC RX 258: Performed by: INTERNAL MEDICINE

## 2020-12-13 PROCEDURE — 84100 ASSAY OF PHOSPHORUS: CPT

## 2020-12-13 PROCEDURE — 85025 COMPLETE CBC W/AUTO DIFF WBC: CPT

## 2020-12-13 PROCEDURE — 1200000000 HC SEMI PRIVATE

## 2020-12-13 PROCEDURE — 6360000002 HC RX W HCPCS: Performed by: NURSE PRACTITIONER

## 2020-12-13 PROCEDURE — 83735 ASSAY OF MAGNESIUM: CPT

## 2020-12-13 RX ORDER — POTASSIUM CHLORIDE 7.45 MG/ML
10 INJECTION INTRAVENOUS PRN
Status: DISCONTINUED | OUTPATIENT
Start: 2020-12-13 | End: 2020-12-14 | Stop reason: HOSPADM

## 2020-12-13 RX ORDER — MAGNESIUM SULFATE IN WATER 40 MG/ML
2 INJECTION, SOLUTION INTRAVENOUS ONCE
Status: COMPLETED | OUTPATIENT
Start: 2020-12-13 | End: 2020-12-13

## 2020-12-13 RX ORDER — LISINOPRIL 20 MG/1
20 TABLET ORAL DAILY
Status: DISCONTINUED | OUTPATIENT
Start: 2020-12-13 | End: 2020-12-14 | Stop reason: HOSPADM

## 2020-12-13 RX ORDER — POTASSIUM CHLORIDE 20 MEQ/1
40 TABLET, EXTENDED RELEASE ORAL PRN
Status: DISCONTINUED | OUTPATIENT
Start: 2020-12-13 | End: 2020-12-14 | Stop reason: HOSPADM

## 2020-12-13 RX ADMIN — Medication 10 ML: at 08:46

## 2020-12-13 RX ADMIN — AMPICILLIN SODIUM AND SULBACTAM SODIUM 3 G: 2; 1 INJECTION, POWDER, FOR SOLUTION INTRAMUSCULAR; INTRAVENOUS at 22:02

## 2020-12-13 RX ADMIN — ACETAMINOPHEN 650 MG: 325 TABLET ORAL at 08:45

## 2020-12-13 RX ADMIN — AMPICILLIN SODIUM AND SULBACTAM SODIUM 3 G: 2; 1 INJECTION, POWDER, FOR SOLUTION INTRAMUSCULAR; INTRAVENOUS at 10:47

## 2020-12-13 RX ADMIN — IBUPROFEN 600 MG: 400 TABLET, FILM COATED ORAL at 10:53

## 2020-12-13 RX ADMIN — AMPICILLIN SODIUM AND SULBACTAM SODIUM 3 G: 2; 1 INJECTION, POWDER, FOR SOLUTION INTRAMUSCULAR; INTRAVENOUS at 04:07

## 2020-12-13 RX ADMIN — THIAMINE HYDROCHLORIDE 100 MG: 100 INJECTION, SOLUTION INTRAMUSCULAR; INTRAVENOUS at 08:45

## 2020-12-13 RX ADMIN — ACETAMINOPHEN 650 MG: 325 TABLET ORAL at 15:25

## 2020-12-13 RX ADMIN — MAGNESIUM SULFATE HEPTAHYDRATE 2 G: 40 INJECTION, SOLUTION INTRAVENOUS at 08:47

## 2020-12-13 RX ADMIN — ACETAMINOPHEN 650 MG: 325 TABLET ORAL at 22:03

## 2020-12-13 RX ADMIN — AMPICILLIN SODIUM AND SULBACTAM SODIUM 3 G: 2; 1 INJECTION, POWDER, FOR SOLUTION INTRAMUSCULAR; INTRAVENOUS at 15:30

## 2020-12-13 RX ADMIN — FAMOTIDINE 20 MG: 20 TABLET, FILM COATED ORAL at 08:45

## 2020-12-13 RX ADMIN — FAMOTIDINE 20 MG: 20 TABLET, FILM COATED ORAL at 22:03

## 2020-12-13 RX ADMIN — IBUPROFEN 600 MG: 400 TABLET, FILM COATED ORAL at 20:10

## 2020-12-13 RX ADMIN — IBUPROFEN 600 MG: 400 TABLET, FILM COATED ORAL at 04:43

## 2020-12-13 RX ADMIN — ATORVASTATIN CALCIUM 40 MG: 40 TABLET, FILM COATED ORAL at 08:46

## 2020-12-13 RX ADMIN — ENOXAPARIN SODIUM 40 MG: 40 INJECTION SUBCUTANEOUS at 08:45

## 2020-12-13 RX ADMIN — AMLODIPINE BESYLATE 10 MG: 10 TABLET ORAL at 08:46

## 2020-12-13 RX ADMIN — Medication 10 ML: at 22:02

## 2020-12-13 ASSESSMENT — PAIN DESCRIPTION - LOCATION
LOCATION: HEAD;NECK
LOCATION: HEAD
LOCATION: HEAD;NECK

## 2020-12-13 ASSESSMENT — PAIN SCALES - WONG BAKER
WONGBAKER_NUMERICALRESPONSE: 0

## 2020-12-13 ASSESSMENT — PAIN DESCRIPTION - ONSET
ONSET: ON-GOING

## 2020-12-13 ASSESSMENT — PAIN SCALES - GENERAL
PAINLEVEL_OUTOF10: 8
PAINLEVEL_OUTOF10: 8
PAINLEVEL_OUTOF10: 5
PAINLEVEL_OUTOF10: 8
PAINLEVEL_OUTOF10: 4

## 2020-12-13 ASSESSMENT — PAIN DESCRIPTION - FREQUENCY
FREQUENCY: CONTINUOUS

## 2020-12-13 ASSESSMENT — PAIN DESCRIPTION - DESCRIPTORS
DESCRIPTORS: ACHING
DESCRIPTORS: ACHING
DESCRIPTORS: ACHING;CONSTANT;DISCOMFORT
DESCRIPTORS: ACHING;CONSTANT;DISCOMFORT
DESCRIPTORS: ACHING

## 2020-12-13 ASSESSMENT — PAIN DESCRIPTION - PAIN TYPE
TYPE: ACUTE PAIN

## 2020-12-13 ASSESSMENT — PAIN DESCRIPTION - ORIENTATION: ORIENTATION: POSTERIOR

## 2020-12-13 NOTE — PROGRESS NOTES
-300 ml       OBJECTIVE:    BP (!) 152/90   Pulse 78   Temp 97.8 °F (36.6 °C) (Temporal)   Resp 16   Ht 5' 5\" (1.651 m)   Wt 180 lb (81.6 kg)   SpO2 93%   BMI 29.95 kg/m²     General appearance: In no apparent distress, appears stated age, and cooperative. HEENT: Normal cephalic, atraumatic without obvious deformity. Pupils equal, round, and reactive to light.  Extra ocular muscles intact. Conjunctivae/corneas clear. Neck: Supple, with full range of motion. No jugular venous distention. Trachea midline. Respiratory:  CTA but diminished throughout the left lung, more so in the base. Rhonchi throughout the right lung. Moist cough. Cardiovascular:  Regular rate and rhythm. S1, S2 without murmurs, rubs, or gallops. PV: Brisk capillary refill.  +2 pedal and radial pulses bilaterally. No clubbing, cyanosis, edema of bilateral lower extremities. Abdomen: Soft, non-tender, non-distended. +BS  Musculoskeletal: No obvious deformities or erythematous or edematous joints. Skin: Normal skin color.  No rashes or lesions. Ecchymosis and soft tissue swelling to the posterior scalp. Neurologic:  Neurovascularly intact without any focal sensory/motor deficits. Cranial nerves: II-XII intact, grossly non-focal.  Psychiatric: Alert, oriented, appropriate.     Medications:  REVIEWED DAILY    Infusion Medications   Scheduled Medications    atorvastatin  40 mg Oral Daily    amLODIPine  10 mg Oral Daily    famotidine  20 mg Oral BID    lisinopril  10 mg Oral Daily    sodium chloride flush  10 mL Intravenous 2 times per day    enoxaparin  40 mg Subcutaneous Daily    ampicillin-sulbactam  3 g Intravenous Q6H    sodium chloride flush  10 mL Intravenous 2 times per day    thiamine  100 mg Intravenous Daily    multivitamin  1 tablet Oral Daily     PRN Meds: ibuprofen, sodium chloride flush, acetaminophen **OR** acetaminophen, polyethylene glycol, promethazine **OR** ondansetron, sodium chloride flush, LORazepam **OR** LORazepam **OR** LORazepam **OR** LORazepam **OR** LORazepam **OR** LORazepam **OR** LORazepam **OR** LORazepam    Labs:     Recent Labs     12/11/20  0309 12/12/20  0650   WBC 8.2 18.5*   HGB 14.4 14.3   HCT 43.0 41.6    328       Recent Labs     12/11/20  0309 12/12/20  0650    140   K 4.3 3.5    104   CO2 22 24   BUN 9 9   CREATININE 1.0 0.9   CALCIUM 8.4* 8.8       Recent Labs     12/11/20  0309 12/12/20  0650   PROT 6.9 6.7   ALKPHOS 63 63   ALT 47* 26   AST 44* 13   BILITOT <0.2 0.5   LIPASE 59  --        Recent Labs     12/12/20  0650   INR 1.0       Recent Labs     12/11/20  0309   TROPONINI <0.01       Chronic labs:    Lab Results   Component Value Date    CHOL 159 12/12/2020    TRIG 244 (H) 12/12/2020    HDL 42 12/12/2020    LDLCALC 68 12/12/2020    INR 1.0 12/12/2020       Radiology: REVIEWED DAILY    ASSESSMENT/PLAN:  Acute hypoxic respiratory respiratory failure - resolved  Pneumonia  Likely aspiration given heavy drinking history and inebriated presentation   Unasyn 1.5G q8h  Urine strep positive  Hypomagnesemia   Replaced, will check tomorrow.    Frequent falls  Likely related to intoxication  Fall precautions  Cephalgia   Likely due to fall and head injury  CT head negative for intracranial process  PRN tylenol & ibuprofen  Lactic acidosis- resolved  Alcohol intoxication delerium - resolved  Polysubstance abuse with history of unintentional overdose  Watch for withdrawal   Alcohol abuse  CIWA protocol ordered, but not requiring medication  Multivitamin, thiamine   Seizure precautions  Hypertension  Will increase lisinopril to 20  Hyperlipidemia  Trigs elevated at 244, otherwise WNL  GERD  Pepcid BID  Tobacco abuse  Transaminitis, resolved        DISPOSITION: Home when stable    +++++++++++++++++++++++++++++++++++++++++++++++++  HUMBERTO Hickman/ [address], [place]  +++++++++++++++++++++++++++++++++++++++++++++++++  NOTE: This report was transcribed using voice recognition software. Every effort was made to ensure accuracy; however, inadvertent computerized transcription errors may be present.

## 2020-12-14 VITALS
OXYGEN SATURATION: 97 % | HEART RATE: 83 BPM | BODY MASS INDEX: 29.99 KG/M2 | TEMPERATURE: 97.1 F | WEIGHT: 180 LBS | HEIGHT: 65 IN | RESPIRATION RATE: 16 BRPM | DIASTOLIC BLOOD PRESSURE: 99 MMHG | SYSTOLIC BLOOD PRESSURE: 183 MMHG

## 2020-12-14 LAB
ANION GAP SERPL CALCULATED.3IONS-SCNC: 10 MMOL/L (ref 7–16)
BASOPHILS ABSOLUTE: 0.04 E9/L (ref 0–0.2)
BASOPHILS RELATIVE PERCENT: 0.5 % (ref 0–2)
BUN BLDV-MCNC: 10 MG/DL (ref 6–20)
CALCIUM SERPL-MCNC: 9.1 MG/DL (ref 8.6–10.2)
CHLORIDE BLD-SCNC: 103 MMOL/L (ref 98–107)
CO2: 28 MMOL/L (ref 22–29)
CREAT SERPL-MCNC: 0.9 MG/DL (ref 0.7–1.2)
EOSINOPHILS ABSOLUTE: 0.11 E9/L (ref 0.05–0.5)
EOSINOPHILS RELATIVE PERCENT: 1.3 % (ref 0–6)
GFR AFRICAN AMERICAN: >60
GFR NON-AFRICAN AMERICAN: >60 ML/MIN/1.73
GLUCOSE BLD-MCNC: 81 MG/DL (ref 74–99)
HCT VFR BLD CALC: 44.7 % (ref 37–54)
HEMOGLOBIN: 15 G/DL (ref 12.5–16.5)
IMMATURE GRANULOCYTES #: 0.03 E9/L
IMMATURE GRANULOCYTES %: 0.3 % (ref 0–5)
LYMPHOCYTES ABSOLUTE: 2.48 E9/L (ref 1.5–4)
LYMPHOCYTES RELATIVE PERCENT: 28.5 % (ref 20–42)
MAGNESIUM: 2 MG/DL (ref 1.6–2.6)
MCH RBC QN AUTO: 32.4 PG (ref 26–35)
MCHC RBC AUTO-ENTMCNC: 33.6 % (ref 32–34.5)
MCV RBC AUTO: 96.5 FL (ref 80–99.9)
METER GLUCOSE: 105 MG/DL (ref 74–99)
MONOCYTES ABSOLUTE: 0.61 E9/L (ref 0.1–0.95)
MONOCYTES RELATIVE PERCENT: 7 % (ref 2–12)
NEUTROPHILS ABSOLUTE: 5.44 E9/L (ref 1.8–7.3)
NEUTROPHILS RELATIVE PERCENT: 62.4 % (ref 43–80)
PDW BLD-RTO: 12.8 FL (ref 11.5–15)
PHOSPHORUS: 3.9 MG/DL (ref 2.5–4.5)
PLATELET # BLD: 324 E9/L (ref 130–450)
PMV BLD AUTO: 10.7 FL (ref 7–12)
POTASSIUM SERPL-SCNC: 3.5 MMOL/L (ref 3.5–5)
RBC # BLD: 4.63 E12/L (ref 3.8–5.8)
SODIUM BLD-SCNC: 141 MMOL/L (ref 132–146)
WBC # BLD: 8.7 E9/L (ref 4.5–11.5)

## 2020-12-14 PROCEDURE — 6370000000 HC RX 637 (ALT 250 FOR IP): Performed by: NURSE PRACTITIONER

## 2020-12-14 PROCEDURE — 82962 GLUCOSE BLOOD TEST: CPT

## 2020-12-14 PROCEDURE — 84100 ASSAY OF PHOSPHORUS: CPT

## 2020-12-14 PROCEDURE — 80048 BASIC METABOLIC PNL TOTAL CA: CPT

## 2020-12-14 PROCEDURE — 85025 COMPLETE CBC W/AUTO DIFF WBC: CPT

## 2020-12-14 PROCEDURE — 6360000002 HC RX W HCPCS: Performed by: INTERNAL MEDICINE

## 2020-12-14 PROCEDURE — 36415 COLL VENOUS BLD VENIPUNCTURE: CPT

## 2020-12-14 PROCEDURE — 83735 ASSAY OF MAGNESIUM: CPT

## 2020-12-14 PROCEDURE — 6370000000 HC RX 637 (ALT 250 FOR IP): Performed by: INTERNAL MEDICINE

## 2020-12-14 PROCEDURE — 2580000003 HC RX 258: Performed by: INTERNAL MEDICINE

## 2020-12-14 RX ORDER — AMOXICILLIN AND CLAVULANATE POTASSIUM 875; 125 MG/1; MG/1
1 TABLET, FILM COATED ORAL 2 TIMES DAILY
Qty: 14 TABLET | Refills: 0 | Status: SHIPPED | OUTPATIENT
Start: 2020-12-14 | End: 2020-12-19

## 2020-12-14 RX ORDER — CYCLOBENZAPRINE HCL 5 MG
5 TABLET ORAL 2 TIMES DAILY PRN
Qty: 10 TABLET | Refills: 0 | Status: SHIPPED | OUTPATIENT
Start: 2020-12-14 | End: 2020-12-19

## 2020-12-14 RX ORDER — THIAMINE MONONITRATE (VIT B1) 100 MG
100 TABLET ORAL DAILY
Qty: 30 TABLET | Refills: 0 | Status: ON HOLD | OUTPATIENT
Start: 2020-12-14 | End: 2021-08-11

## 2020-12-14 RX ORDER — IBUPROFEN 600 MG/1
600 TABLET ORAL 3 TIMES DAILY PRN
Qty: 9 TABLET | Refills: 0 | Status: ON HOLD | OUTPATIENT
Start: 2020-12-14 | End: 2021-08-12 | Stop reason: HOSPADM

## 2020-12-14 RX ORDER — ATORVASTATIN CALCIUM 20 MG/1
40 TABLET, FILM COATED ORAL DAILY
Qty: 30 TABLET | Refills: 0 | Status: SHIPPED | OUTPATIENT
Start: 2020-12-14 | End: 2022-08-25

## 2020-12-14 RX ORDER — POTASSIUM CHLORIDE 20 MEQ/1
20 TABLET, EXTENDED RELEASE ORAL ONCE
Status: COMPLETED | OUTPATIENT
Start: 2020-12-14 | End: 2020-12-14

## 2020-12-14 RX ORDER — CYCLOBENZAPRINE HCL 10 MG
10 TABLET ORAL ONCE
Status: COMPLETED | OUTPATIENT
Start: 2020-12-14 | End: 2020-12-14

## 2020-12-14 RX ORDER — FOLIC ACID 1 MG/1
1 TABLET ORAL DAILY
Qty: 90 TABLET | Refills: 0 | Status: SHIPPED | OUTPATIENT
Start: 2020-12-14 | End: 2021-10-28

## 2020-12-14 RX ADMIN — ENOXAPARIN SODIUM 40 MG: 40 INJECTION SUBCUTANEOUS at 08:34

## 2020-12-14 RX ADMIN — LISINOPRIL 20 MG: 20 TABLET ORAL at 08:34

## 2020-12-14 RX ADMIN — Medication 10 ML: at 08:35

## 2020-12-14 RX ADMIN — ATORVASTATIN CALCIUM 40 MG: 40 TABLET, FILM COATED ORAL at 08:34

## 2020-12-14 RX ADMIN — AMLODIPINE BESYLATE 10 MG: 10 TABLET ORAL at 08:35

## 2020-12-14 RX ADMIN — FAMOTIDINE 20 MG: 20 TABLET, FILM COATED ORAL at 08:34

## 2020-12-14 RX ADMIN — AMPICILLIN SODIUM AND SULBACTAM SODIUM 3 G: 2; 1 INJECTION, POWDER, FOR SOLUTION INTRAMUSCULAR; INTRAVENOUS at 04:35

## 2020-12-14 RX ADMIN — Medication 1 TABLET: at 08:35

## 2020-12-14 RX ADMIN — IBUPROFEN 600 MG: 400 TABLET, FILM COATED ORAL at 02:32

## 2020-12-14 RX ADMIN — AMPICILLIN SODIUM AND SULBACTAM SODIUM 3 G: 2; 1 INJECTION, POWDER, FOR SOLUTION INTRAMUSCULAR; INTRAVENOUS at 08:52

## 2020-12-14 RX ADMIN — CYCLOBENZAPRINE 10 MG: 10 TABLET, FILM COATED ORAL at 08:51

## 2020-12-14 RX ADMIN — THIAMINE HYDROCHLORIDE 100 MG: 100 INJECTION, SOLUTION INTRAMUSCULAR; INTRAVENOUS at 08:34

## 2020-12-14 RX ADMIN — ACETAMINOPHEN 650 MG: 325 TABLET ORAL at 04:37

## 2020-12-14 RX ADMIN — POTASSIUM CHLORIDE 20 MEQ: 1500 TABLET, EXTENDED RELEASE ORAL at 08:52

## 2020-12-14 ASSESSMENT — PAIN DESCRIPTION - LOCATION
LOCATION: HEAD;NECK
LOCATION: HEAD;NECK

## 2020-12-14 ASSESSMENT — PAIN DESCRIPTION - ONSET
ONSET: ON-GOING
ONSET: ON-GOING

## 2020-12-14 ASSESSMENT — PAIN DESCRIPTION - DESCRIPTORS
DESCRIPTORS: ACHING
DESCRIPTORS: ACHING

## 2020-12-14 ASSESSMENT — PAIN SCALES - GENERAL
PAINLEVEL_OUTOF10: 8
PAINLEVEL_OUTOF10: 8
PAINLEVEL_OUTOF10: 3

## 2020-12-14 ASSESSMENT — PAIN SCALES - WONG BAKER
WONGBAKER_NUMERICALRESPONSE: 0
WONGBAKER_NUMERICALRESPONSE: 0

## 2020-12-14 ASSESSMENT — PAIN DESCRIPTION - FREQUENCY
FREQUENCY: CONTINUOUS
FREQUENCY: CONTINUOUS

## 2020-12-14 ASSESSMENT — PAIN DESCRIPTION - PAIN TYPE
TYPE: ACUTE PAIN
TYPE: ACUTE PAIN

## 2020-12-14 NOTE — DISCHARGE SUMMARY
Hospitalist Discharge Summary    Patient ID: Yemi Bertha   Patient : 1980  Patient's PCP: No primary care provider on file. Admit Date: 2020   Admitting Physician: Christopher Lira DO    Discharge Date:  2020   Discharge Physician: ALMA Becker NP   Discharge Condition: Stable  Discharge Disposition: [facility] course in brief:  (Please refer to daily progress notes for a comprehensive review of the hospitalization by requesting medical records)    Patient admitted on 2020 for multiple falls at home.  The patient was intoxicated on arrival. He also has a history of alcohol abuse. He denied any chest pain, shortness of breath, vomiting, diarrhea, fever or chills, or leg pain or swelling.  EMS reported that his SPO2 was in the 80s on room air. Eddye Janneth admitted to left lower abdominal pain that he has had for some time and a mild cough. He also admitted to hitting his head during his last fall and has a residual headache. In ER he was found to be clearly intoxicated with a hematoma to the occipital scalp.  He was placed in a c-collar which he refused to wear. Imaging was negative for acute osseous process or intercranial bleed.  His work-up was relatively unremarkable aside from a lactic acid of 3.3, mild elevation in ALT/AST, and a UDS positive for cannabinoids and cocaine.  Chest x-ray with evidence of left-sided pulmonary edema, pneumonia, or aspiration and CT of the chest with ill-defined nodular pulmonary opacities bilaterally.  Patient was tested for Covid but was negative. He was treated with Unasyn for pneumonia and is no longer requiring oxygen. Leukocytosis is resolving.   He is stable for discharge on PO antibiotics with follow up with a PCP which will be arranged prior to discharge.       Consults:   IP CONSULT TO INTERNAL MEDICINE  IP CONSULT TO SOCIAL WORK    Discharge Diagnoses:    Acute hypoxic respiratory respiratory failure - RARE 12/11/2020    RBCUA NONE 12/11/2020    BLOODU TRACE-INTACT 12/11/2020    SPECGRAV >=1.030 12/11/2020    GLUCOSEU Negative 12/11/2020       Imaging:  Ct Head Wo Contrast    Result Date: 12/11/2020  EXAMINATION: CT OF THE HEAD WITHOUT CONTRAST 12/11/2020 5:16 am TECHNIQUE: CT of the head was performed without the administration of intravenous contrast. Dose modulation, iterative reconstruction, and/or weight based adjustment of the mA/kV was utilized to reduce the radiation dose to as low as reasonably achievable. COMPARISON: Head CT 10/20/2020 HISTORY: ORDERING SYSTEM PROVIDED HISTORY: Evaluate intracranial abnormality TECHNOLOGIST PROVIDED HISTORY: Has a \"code stroke\" or \"stroke alert\" been called? ->No Reason for exam:->Evaluate intracranial abnormality What reading provider will be dictating this exam?->CRC FINDINGS: BRAIN/VENTRICLES:  No masses nor acute intracranial hemorrhage. Intact gray/white matter differentiation without findings of acute ischemia. No mass effect nor midline shift. Patent basilar cisterns and foramen magnum. No hydrocephalus. Minimal diffuse atrophy. ORBITS:  Normal without acute abnormality. 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 Varying degrees of minimal to moderate mucosal thickening in the bilateral ethmoid sinuses. Minimal to mild mucosal thickening in the right maxillary sinus. Minimal mucosal thickening in the bilateral sphenoid sinuses. Near complete opacification of the left mastoid air cells and partial opacification of the adjacent left middle ear with associated sclerotic changes in the left mastoid process, suggesting chronic effusion. SOFT TISSUES/SKULL:  Suspected subcutaneous contusion in the right paramedian occipital scalp. Mild atherosclerotic calcifications. No acute fracture. 1. No acute intracranial abnormality nor acute calvarial fracture. 2. Suspected subcutaneous contusion in the occipital scalp.     Ct Cervical Spine Wo Contrast    Result Date: 12/11/2020  EXAMINATION: CT OF THE CERVICAL SPINE WITHOUT CONTRAST 12/11/2020 5:16 am TECHNIQUE: CT of the cervical spine was performed without the administration of intravenous contrast. Multiplanar reformatted images are provided for review. Dose modulation, iterative reconstruction, and/or weight based adjustment of the mA/kV was utilized to reduce the radiation dose to as low as reasonably achievable. COMPARISON: None. HISTORY: ORDERING SYSTEM PROVIDED HISTORY: Pain TECHNOLOGIST PROVIDED HISTORY: Reason for exam:->Pain What reading provider will be dictating this exam?->CRC FINDINGS: BONES/ALIGNMENT: There is no definite fracture on a study limited by motion artifact. Vertebral body alignment is normal. DEGENERATIVE CHANGES: No significant degenerative changes. SOFT TISSUES: There is no prevertebral soft tissue swelling. No definite fracture on a limited study. Ct Abdomen Pelvis W Iv Contrast Additional Contrast? None    Result Date: 12/11/2020  EXAMINATION: CTA OF THE CHEST; CT OF THE ABDOMEN AND PELVIS WITH CONTRAST 12/11/2020 5:16 am TECHNIQUE: CTA of the chest was performed after the administration of intravenous contrast.  Multiplanar reformatted images are provided for review. MIP images are provided for review. Dose modulation, iterative reconstruction, and/or weight based adjustment of the mA/kV was utilized to reduce the radiation dose to as low as reasonably achievable.; CT of the abdomen and pelvis was performed with the administration of intravenous contrast. Multiplanar reformatted images are provided for review. Dose modulation, iterative reconstruction, and/or weight based adjustment of the mA/kV was utilized to reduce the radiation dose to as low as reasonably achievable.  COMPARISON: 08/29/2008 HISTORY: ORDERING SYSTEM PROVIDED HISTORY: Hypoxia fall TECHNOLOGIST PROVIDED HISTORY: Reason for exam:->Hypoxia fall What reading provider will be dictating this exam?->CRC; ORDERING SYSTEM PROVIDED HISTORY: abdominal pain TECHNOLOGIST PROVIDED HISTORY: Reason for exam:->abdominal pain Additional Contrast?->None What reading provider will be dictating this exam?->CRC FINDINGS: Pulmonary Arteries: Pulmonary arteries are adequately opacified for evaluation. No evidence of intraluminal filling defect to suggest pulmonary embolism. Main pulmonary artery is normal in caliber. Mediastinum: No evidence of mediastinal lymphadenopathy. The heart and pericardium demonstrate no acute abnormality. There is no acute abnormality of the thoracic aorta. Hiatal hernia is noted. Lungs/pleura: Evaluation of fine parenchymal changes is compromised by motion artifact. Left lower lobe airway secretions are noted. Emphysema is seen. There is no pneumothorax or pleural effusion. There are innumerable poorly defined nodular opacities throughout both lungs. Organs: The liver, spleen, pancreas, gallbladder, adrenal glands and kidneys are unremarkable. GI/Bowel: Small bowel caliber is normal.  The appendix is not identified. Fatty infiltration in the wall of the cecum and ascending colon is nonspecific though can be a marker of chronic inflammation. The colon is otherwise unremarkable. Pelvis: The bladder is grossly negative. Peritoneum/Retroperitoneum: No adenopathy, mesenteric stranding or free fluid. Aortic caliber is normal. Bones/Soft Tissues: No acute findings. In particular no fracture is seen. 1. No scan evidence for pulmonary embolus. 2. Ill-defined nodular pulmonary opacities bilaterally are likely infectious or inflammatory, including the possibility of COVID related pneumonia. 3. No traumatic abnormality in the chest, abdomen or pelvis.     Xr Chest Portable    Result Date: 12/11/2020  EXAMINATION: ONE XRAY VIEW OF THE CHEST 12/11/2020 3:13 am COMPARISON: 10/27/2020 HISTORY: ORDERING SYSTEM PROVIDED HISTORY: fall TECHNOLOGIST PROVIDED HISTORY: Reason for exam:->fall What reading provider will be dictating this exam?->CRC Anthony Stahl is a 36 y.o. male presenting to the ED for history of falls, beginning hours ago. The complaint has been persistent, moderate in severity, and worsened by nothing. Patient reporting multiple falls at home. Patient had been drinking. Patient reporting no chest pain or difficulty breathing EMS reports that his pulse ox was in the 80s on arrival.  Patient reporting left lower abdominal pain he has had for quite some time. Patient reporting no vomiting or diarrhea reports some mild cough he reports no leg pain or swelling. Reports no numbness or tingling. Patient reporting no fever or chills. He does report headache. He reports that his headache is from falling and hitting his head. FINDINGS: There is ill-defined airspace opacity throughout the left lung. The right lung is clear. The cardiomediastinal silhouette is normal.  The costophrenic angles are sharp. There is no discernible pneumothorax. No fracture is identified. Left-sided pulmonary edema, pneumonia or aspiration. Cta Chest W Contrast    Result Date: 12/11/2020  EXAMINATION: CTA OF THE CHEST; CT OF THE ABDOMEN AND PELVIS WITH CONTRAST 12/11/2020 5:16 am TECHNIQUE: CTA of the chest was performed after the administration of intravenous contrast.  Multiplanar reformatted images are provided for review. MIP images are provided for review. Dose modulation, iterative reconstruction, and/or weight based adjustment of the mA/kV was utilized to reduce the radiation dose to as low as reasonably achievable.; CT of the abdomen and pelvis was performed with the administration of intravenous contrast. Multiplanar reformatted images are provided for review. Dose modulation, iterative reconstruction, and/or weight based adjustment of the mA/kV was utilized to reduce the radiation dose to as low as reasonably achievable.  COMPARISON: 08/29/2008 HISTORY: ORDERING SYSTEM PROVIDED HISTORY: Hypoxia fall TECHNOLOGIST PROVIDED HISTORY: Reason for exam:->Hypoxia fall What reading provider will be dictating this exam?->CRC; ORDERING SYSTEM PROVIDED HISTORY: abdominal pain TECHNOLOGIST PROVIDED HISTORY: Reason for exam:->abdominal pain Additional Contrast?->None What reading provider will be dictating this exam?->CRC FINDINGS: Pulmonary Arteries: Pulmonary arteries are adequately opacified for evaluation. No evidence of intraluminal filling defect to suggest pulmonary embolism. Main pulmonary artery is normal in caliber. Mediastinum: No evidence of mediastinal lymphadenopathy. The heart and pericardium demonstrate no acute abnormality. There is no acute abnormality of the thoracic aorta. Hiatal hernia is noted. Lungs/pleura: Evaluation of fine parenchymal changes is compromised by motion artifact. Left lower lobe airway secretions are noted. Emphysema is seen. There is no pneumothorax or pleural effusion. There are innumerable poorly defined nodular opacities throughout both lungs. Organs: The liver, spleen, pancreas, gallbladder, adrenal glands and kidneys are unremarkable. GI/Bowel: Small bowel caliber is normal.  The appendix is not identified. Fatty infiltration in the wall of the cecum and ascending colon is nonspecific though can be a marker of chronic inflammation. The colon is otherwise unremarkable. Pelvis: The bladder is grossly negative. Peritoneum/Retroperitoneum: No adenopathy, mesenteric stranding or free fluid. Aortic caliber is normal. Bones/Soft Tissues: No acute findings. In particular no fracture is seen. 1. No scan evidence for pulmonary embolus. 2. Ill-defined nodular pulmonary opacities bilaterally are likely infectious or inflammatory, including the possibility of COVID related pneumonia. 3. No traumatic abnormality in the chest, abdomen or pelvis.       Discharge Medications:      Medication List      START taking these medications    amoxicillin-clavulanate 875-125 MG present.

## 2020-12-14 NOTE — PROGRESS NOTES
CLINICAL PHARMACY NOTE: MEDS TO [address] [place] [address] Drive Select Patient?: No  Total # of Prescriptions Filled: 6   The following medications were delivered to the patient:  · Atorvastatin calcium 20 mg  · Cyclobenzaprine 5 mg  · Thiamine 100 mg  · Ibuprofen 600 mg  · Amoxicillin-pot clavul 130-565  · Folic acid 1 mg  Total # of Interventions Completed: 3  Time Spent (min): 15    Additional Documentation:

## 2020-12-16 LAB
BLOOD CULTURE, ROUTINE: NORMAL
CULTURE, BLOOD 2: NORMAL

## 2021-02-03 LAB
ALBUMIN SERPL-MCNC: NORMAL G/DL
ALP BLD-CCNC: NORMAL U/L
ALT SERPL-CCNC: NORMAL U/L
ANION GAP SERPL CALCULATED.3IONS-SCNC: NORMAL MMOL/L
ANTIBODY: NORMAL
AST SERPL-CCNC: NORMAL U/L
AVERAGE GLUCOSE: 103
BILIRUB SERPL-MCNC: NORMAL MG/DL
BUN BLDV-MCNC: NORMAL MG/DL
CALCIUM SERPL-MCNC: NORMAL MG/DL
CHLORIDE BLD-SCNC: NORMAL MMOL/L
CHOLESTEROL, TOTAL: NORMAL
CHOLESTEROL/HDL RATIO: NORMAL
CO2: NORMAL
CREAT SERPL-MCNC: NORMAL MG/DL
GFR CALCULATED: NORMAL
GLUCOSE BLD-MCNC: NORMAL MG/DL
HBA1C MFR BLD: 5.2 %
HDLC SERPL-MCNC: NORMAL MG/DL
LDL CHOLESTEROL CALCULATED: NORMAL
NONHDLC SERPL-MCNC: NORMAL MG/DL
POTASSIUM SERPL-SCNC: NORMAL MMOL/L
SODIUM BLD-SCNC: NORMAL MMOL/L
TOTAL PROTEIN: NORMAL
TRIGL SERPL-MCNC: NORMAL MG/DL
VITAMIN D 25-HYDROXY: NORMAL
VITAMIN D2, 25 HYDROXY: NORMAL
VITAMIN D3,25 HYDROXY: NORMAL
VLDLC SERPL CALC-MCNC: NORMAL MG/DL

## 2021-08-06 NOTE — PROGRESS NOTES
Hospitalist Progress Note      SYNOPSIS: Patient admitted on 2020 for multiple falls at home. The patient was intoxicated on arrival.  He denies any chest pain, shortness of breath, vomiting, diarrhea, fever or chills, or leg pain or swelling. EMS reports that his SPO2 was in the 80s on room air. He admits to left lower abdominal pain that he has had for some time and a mild cough. He also admits to hitting his head during his last fall and has a residual headache. In ER he was found to be clearly intoxicated with a hematoma to the occipital scalp. He was placed in a c-collar which he refused to wear. Imaging was negative for acute osseous process. His work-up was relatively unremarkable aside from a lactic acid of 3.3, mild elevation in ALT/AST, and a UDS positive for cannabinoids and cocaine. Chest x-ray with evidence of left-sided pulmonary edema, pneumonia, or aspiration and CT of the chest with ill-defined nodular pulmonary opacities bilaterally. Patient was tested for Covid but was negative. : Apparently there were issues overnight with patient's mother calling in and insisting that she come pick him up and he sign out AMA. The patient, surprisingly given his history of signing out AMA, had no desire to leave. Only required one dose of ativan overnight. Patient doing well, complaining of posterior head pain where he struck his head during fall. Otherwise doing well. Hospital day 1     SUBJECTIVE:  Stable overnight. No issues reported. Patient seen and examined  Records reviewed.        Temp (24hrs), Av.3 °F (36.3 °C), Min:96.9 °F (36.1 °C), Max:97.7 °F (36.5 °C)    DIET: DIET GENERAL;  CODE: Full Code    Intake/Output Summary (Last 24 hours) at 2020 0834  Last data filed at 2020 1819  Gross per 24 hour   Intake 100 ml   Output --   Net 100 ml       OBJECTIVE:    BP (!) 152/105   Pulse 81   Temp 96.9 °F (36.1 °C) (Temporal)   Resp 20   Ht 5' 5\" (1.651 m)   [employer] 180 lb (81.6 kg)   SpO2 95%   BMI 29.95 kg/m²     General appearance: In no apparent distress, appears stated age, and cooperative. HEENT: Normal cephalic, atraumatic without obvious deformity. Pupils equal, round, and reactive to light. Extra ocular muscles intact. Conjunctivae/corneas clear. Neck: Supple, with full range of motion. No jugular venous distention. Trachea midline. Respiratory:  CTA but diminished throughout the left lung, more so in the base. Rhonchi throughout the right lung. Moist cough. Cardiovascular:  Regular rate and rhythm. S1, S2 without murmurs, rubs, or gallops. PV: Brisk capillary refill. +2 pedal and radial pulses bilaterally. No clubbing, cyanosis, edema of bilateral lower extremities. Abdomen: Soft, non-tender, non-distended. +BS  Musculoskeletal: No obvious deformities or erythematous or edematous joints. Skin: Normal skin color. No rashes or lesions. Ecchymosis and soft tissue swelling to the posterior scalp. Neurologic:  Neurovascularly intact without any focal sensory/motor deficits. Cranial nerves: II-XII intact, grossly non-focal.  Psychiatric: Alert, oriented, appropriate.     Medications:  REVIEWED DAILY    Infusion Medications    sodium chloride 100 mL/hr at 12/12/20 0043     Scheduled Medications    amLODIPine  10 mg Oral Daily    atorvastatin  20 mg Oral Daily    famotidine  20 mg Oral BID    lisinopril  10 mg Oral Daily    sodium chloride flush  10 mL Intravenous 2 times per day    enoxaparin  40 mg Subcutaneous Daily    ampicillin-sulbactam  3 g Intravenous Q6H    sodium chloride flush  10 mL Intravenous 2 times per day    thiamine  100 mg Intravenous Daily    multivitamin  1 tablet Oral Daily     PRN Meds: sodium chloride flush, acetaminophen **OR** acetaminophen, polyethylene glycol, promethazine **OR** ondansetron, sodium chloride flush, LORazepam **OR** LORazepam **OR** LORazepam **OR** LORazepam **OR** LORazepam **OR** LORazepam **OR** No

## 2021-08-10 PROCEDURE — 99283 EMERGENCY DEPT VISIT LOW MDM: CPT

## 2021-08-11 ENCOUNTER — APPOINTMENT (OUTPATIENT)
Dept: CT IMAGING | Age: 41
DRG: 055 | End: 2021-08-11
Payer: COMMERCIAL

## 2021-08-11 ENCOUNTER — HOSPITAL ENCOUNTER (INPATIENT)
Age: 41
LOS: 1 days | Discharge: HOME | DRG: 055 | End: 2021-08-12
Attending: EMERGENCY MEDICINE | Admitting: FAMILY MEDICINE
Payer: COMMERCIAL

## 2021-08-11 ENCOUNTER — APPOINTMENT (OUTPATIENT)
Dept: GENERAL RADIOLOGY | Age: 41
DRG: 055 | End: 2021-08-11
Payer: COMMERCIAL

## 2021-08-11 DIAGNOSIS — S06.5XAA SDH (SUBDURAL HEMATOMA): Primary | ICD-10-CM

## 2021-08-11 LAB
ANION GAP SERPL CALCULATED.3IONS-SCNC: 14 MMOL/L (ref 7–16)
ANION GAP SERPL CALCULATED.3IONS-SCNC: 15 MMOL/L (ref 7–16)
APTT: 30.3 SEC (ref 24.5–35.1)
BACTERIA: NORMAL /HPF
BASOPHILS ABSOLUTE: 0.03 E9/L (ref 0–0.2)
BASOPHILS RELATIVE PERCENT: 0.3 % (ref 0–2)
BILIRUBIN URINE: NEGATIVE
BLOOD, URINE: ABNORMAL
BUN BLDV-MCNC: 24 MG/DL (ref 6–20)
BUN BLDV-MCNC: 27 MG/DL (ref 6–20)
CALCIUM SERPL-MCNC: 9 MG/DL (ref 8.6–10.2)
CALCIUM SERPL-MCNC: 9 MG/DL (ref 8.6–10.2)
CHLORIDE BLD-SCNC: 91 MMOL/L (ref 98–107)
CHLORIDE BLD-SCNC: 95 MMOL/L (ref 98–107)
CHOLESTEROL, TOTAL: 114 MG/DL (ref 0–199)
CLARITY: CLEAR
CO2: 26 MMOL/L (ref 22–29)
CO2: 28 MMOL/L (ref 22–29)
COLOR: YELLOW
CREAT SERPL-MCNC: 2.1 MG/DL (ref 0.7–1.2)
CREAT SERPL-MCNC: 2.6 MG/DL (ref 0.7–1.2)
CREATININE URINE: 55 MG/DL (ref 40–278)
EOSINOPHILS ABSOLUTE: 0.04 E9/L (ref 0.05–0.5)
EOSINOPHILS RELATIVE PERCENT: 0.4 % (ref 0–6)
EPITHELIAL CELLS, UA: NORMAL /HPF
GFR AFRICAN AMERICAN: 33
GFR AFRICAN AMERICAN: 42
GFR NON-AFRICAN AMERICAN: 33 ML/MIN/1.73
GFR NON-AFRICAN AMERICAN: 42 ML/MIN/1.73
GLUCOSE BLD-MCNC: 109 MG/DL (ref 74–99)
GLUCOSE BLD-MCNC: 87 MG/DL (ref 74–99)
GLUCOSE URINE: NEGATIVE MG/DL
HBA1C MFR BLD: 4.8 % (ref 4–5.6)
HCT VFR BLD CALC: 43.6 % (ref 37–54)
HDLC SERPL-MCNC: 32 MG/DL
HEMOGLOBIN: 15 G/DL (ref 12.5–16.5)
IMMATURE GRANULOCYTES #: 0.04 E9/L
IMMATURE GRANULOCYTES %: 0.4 % (ref 0–5)
INR BLD: 0.9
KETONES, URINE: NEGATIVE MG/DL
LDL CHOLESTEROL CALCULATED: 51 MG/DL (ref 0–99)
LEUKOCYTE ESTERASE, URINE: ABNORMAL
LYMPHOCYTES ABSOLUTE: 2.24 E9/L (ref 1.5–4)
LYMPHOCYTES RELATIVE PERCENT: 20 % (ref 20–42)
MCH RBC QN AUTO: 33.5 PG (ref 26–35)
MCHC RBC AUTO-ENTMCNC: 34.4 % (ref 32–34.5)
MCV RBC AUTO: 97.3 FL (ref 80–99.9)
MICROALBUMIN UR-MCNC: 24.5 MG/L
MICROALBUMIN/CREAT UR-RTO: 44.5 (ref 0–30)
MONOCYTES ABSOLUTE: 0.91 E9/L (ref 0.1–0.95)
MONOCYTES RELATIVE PERCENT: 8.1 % (ref 2–12)
NEUTROPHILS ABSOLUTE: 7.94 E9/L (ref 1.8–7.3)
NEUTROPHILS RELATIVE PERCENT: 70.8 % (ref 43–80)
NITRITE, URINE: NEGATIVE
PDW BLD-RTO: 13.2 FL (ref 11.5–15)
PH UA: 6 (ref 5–9)
PLATELET # BLD: 347 E9/L (ref 130–450)
PMV BLD AUTO: 11.2 FL (ref 7–12)
POTASSIUM SERPL-SCNC: 3.1 MMOL/L (ref 3.5–5)
POTASSIUM SERPL-SCNC: 3.1 MMOL/L (ref 3.5–5)
PROTEIN UA: NEGATIVE MG/DL
PROTHROMBIN TIME: 10.1 SEC (ref 9.3–12.4)
RBC # BLD: 4.48 E12/L (ref 3.8–5.8)
RBC UA: NORMAL /HPF (ref 0–2)
SODIUM BLD-SCNC: 133 MMOL/L (ref 132–146)
SODIUM BLD-SCNC: 136 MMOL/L (ref 132–146)
SPECIFIC GRAVITY UA: <=1.005 (ref 1–1.03)
TRIGL SERPL-MCNC: 155 MG/DL (ref 0–149)
UROBILINOGEN, URINE: 0.2 E.U./DL
VLDLC SERPL CALC-MCNC: 31 MG/DL
WBC # BLD: 11.2 E9/L (ref 4.5–11.5)
WBC UA: NORMAL /HPF (ref 0–5)

## 2021-08-11 PROCEDURE — 6360000002 HC RX W HCPCS: Performed by: EMERGENCY MEDICINE

## 2021-08-11 PROCEDURE — 82570 ASSAY OF URINE CREATININE: CPT

## 2021-08-11 PROCEDURE — 2580000003 HC RX 258: Performed by: FAMILY MEDICINE

## 2021-08-11 PROCEDURE — 83036 HEMOGLOBIN GLYCOSYLATED A1C: CPT

## 2021-08-11 PROCEDURE — 2060000000 HC ICU INTERMEDIATE R&B

## 2021-08-11 PROCEDURE — 87205 SMEAR GRAM STAIN: CPT

## 2021-08-11 PROCEDURE — 81001 URINALYSIS AUTO W/SCOPE: CPT

## 2021-08-11 PROCEDURE — 6370000000 HC RX 637 (ALT 250 FOR IP): Performed by: FAMILY MEDICINE

## 2021-08-11 PROCEDURE — 80061 LIPID PANEL: CPT

## 2021-08-11 PROCEDURE — 92523 SPEECH SOUND LANG COMPREHEN: CPT

## 2021-08-11 PROCEDURE — 51798 US URINE CAPACITY MEASURE: CPT

## 2021-08-11 PROCEDURE — 6370000000 HC RX 637 (ALT 250 FOR IP): Performed by: PHYSICIAN ASSISTANT

## 2021-08-11 PROCEDURE — 36415 COLL VENOUS BLD VENIPUNCTURE: CPT

## 2021-08-11 PROCEDURE — 85730 THROMBOPLASTIN TIME PARTIAL: CPT

## 2021-08-11 PROCEDURE — 80048 BASIC METABOLIC PNL TOTAL CA: CPT

## 2021-08-11 PROCEDURE — 70450 CT HEAD/BRAIN W/O DYE: CPT

## 2021-08-11 PROCEDURE — 72110 X-RAY EXAM L-2 SPINE 4/>VWS: CPT

## 2021-08-11 PROCEDURE — 99253 IP/OBS CNSLTJ NEW/EST LOW 45: CPT | Performed by: NEUROLOGICAL SURGERY

## 2021-08-11 PROCEDURE — 6360000002 HC RX W HCPCS: Performed by: PHYSICIAN ASSISTANT

## 2021-08-11 PROCEDURE — 97161 PT EVAL LOW COMPLEX 20 MIN: CPT

## 2021-08-11 PROCEDURE — 85610 PROTHROMBIN TIME: CPT

## 2021-08-11 PROCEDURE — 85025 COMPLETE CBC W/AUTO DIFF WBC: CPT

## 2021-08-11 PROCEDURE — 82044 UR ALBUMIN SEMIQUANTITATIVE: CPT

## 2021-08-11 RX ORDER — ATORVASTATIN CALCIUM 40 MG/1
40 TABLET, FILM COATED ORAL DAILY
Status: DISCONTINUED | OUTPATIENT
Start: 2021-08-11 | End: 2021-08-12 | Stop reason: HOSPADM

## 2021-08-11 RX ORDER — POTASSIUM CHLORIDE 7.45 MG/ML
10 INJECTION INTRAVENOUS PRN
Status: DISCONTINUED | OUTPATIENT
Start: 2021-08-11 | End: 2021-08-12 | Stop reason: HOSPADM

## 2021-08-11 RX ORDER — SODIUM CHLORIDE 9 MG/ML
25 INJECTION, SOLUTION INTRAVENOUS PRN
Status: DISCONTINUED | OUTPATIENT
Start: 2021-08-11 | End: 2021-08-12 | Stop reason: HOSPADM

## 2021-08-11 RX ORDER — ACETAMINOPHEN 500 MG
1000 TABLET ORAL ONCE
Status: COMPLETED | OUTPATIENT
Start: 2021-08-11 | End: 2021-08-11

## 2021-08-11 RX ORDER — GAUZE BANDAGE 2" X 2"
100 BANDAGE TOPICAL DAILY
Status: DISCONTINUED | OUTPATIENT
Start: 2021-08-11 | End: 2021-08-12 | Stop reason: HOSPADM

## 2021-08-11 RX ORDER — SODIUM CHLORIDE 0.9 % (FLUSH) 0.9 %
10 SYRINGE (ML) INJECTION PRN
Status: DISCONTINUED | OUTPATIENT
Start: 2021-08-11 | End: 2021-08-12 | Stop reason: HOSPADM

## 2021-08-11 RX ORDER — SODIUM CHLORIDE 0.9 % (FLUSH) 0.9 %
5-40 SYRINGE (ML) INJECTION PRN
Status: DISCONTINUED | OUTPATIENT
Start: 2021-08-11 | End: 2021-08-12 | Stop reason: HOSPADM

## 2021-08-11 RX ORDER — LEVETIRACETAM 10 MG/ML
1000 INJECTION INTRAVASCULAR ONCE
Status: COMPLETED | OUTPATIENT
Start: 2021-08-11 | End: 2021-08-11

## 2021-08-11 RX ORDER — ACETAMINOPHEN 325 MG/1
650 TABLET ORAL EVERY 4 HOURS PRN
Status: DISCONTINUED | OUTPATIENT
Start: 2021-08-11 | End: 2021-08-12 | Stop reason: HOSPADM

## 2021-08-11 RX ORDER — SODIUM CHLORIDE 9 MG/ML
INJECTION, SOLUTION INTRAVENOUS CONTINUOUS
Status: DISCONTINUED | OUTPATIENT
Start: 2021-08-11 | End: 2021-08-12 | Stop reason: HOSPADM

## 2021-08-11 RX ORDER — POTASSIUM CHLORIDE 20 MEQ/1
40 TABLET, EXTENDED RELEASE ORAL PRN
Status: DISCONTINUED | OUTPATIENT
Start: 2021-08-11 | End: 2021-08-12 | Stop reason: HOSPADM

## 2021-08-11 RX ORDER — SODIUM CHLORIDE 0.9 % (FLUSH) 0.9 %
5-40 SYRINGE (ML) INJECTION EVERY 12 HOURS SCHEDULED
Status: DISCONTINUED | OUTPATIENT
Start: 2021-08-11 | End: 2021-08-12 | Stop reason: HOSPADM

## 2021-08-11 RX ORDER — ONDANSETRON 4 MG/1
4 TABLET, ORALLY DISINTEGRATING ORAL EVERY 8 HOURS PRN
Status: DISCONTINUED | OUTPATIENT
Start: 2021-08-11 | End: 2021-08-12 | Stop reason: HOSPADM

## 2021-08-11 RX ORDER — FOLIC ACID 1 MG/1
1 TABLET ORAL DAILY
Status: DISCONTINUED | OUTPATIENT
Start: 2021-08-11 | End: 2021-08-12 | Stop reason: HOSPADM

## 2021-08-11 RX ORDER — AMLODIPINE BESYLATE 10 MG/1
10 TABLET ORAL DAILY
Status: DISCONTINUED | OUTPATIENT
Start: 2021-08-11 | End: 2021-08-12 | Stop reason: HOSPADM

## 2021-08-11 RX ORDER — LABETALOL HYDROCHLORIDE 5 MG/ML
10 INJECTION, SOLUTION INTRAVENOUS EVERY 10 MIN PRN
Status: DISCONTINUED | OUTPATIENT
Start: 2021-08-11 | End: 2021-08-12 | Stop reason: HOSPADM

## 2021-08-11 RX ORDER — FAMOTIDINE 20 MG/1
10 TABLET, FILM COATED ORAL 2 TIMES DAILY
Status: DISCONTINUED | OUTPATIENT
Start: 2021-08-11 | End: 2021-08-12 | Stop reason: HOSPADM

## 2021-08-11 RX ORDER — ONDANSETRON 2 MG/ML
4 INJECTION INTRAMUSCULAR; INTRAVENOUS EVERY 6 HOURS PRN
Status: DISCONTINUED | OUTPATIENT
Start: 2021-08-11 | End: 2021-08-12 | Stop reason: HOSPADM

## 2021-08-11 RX ORDER — FENTANYL CITRATE 50 UG/ML
50 INJECTION, SOLUTION INTRAMUSCULAR; INTRAVENOUS ONCE
Status: COMPLETED | OUTPATIENT
Start: 2021-08-11 | End: 2021-08-11

## 2021-08-11 RX ORDER — HYDROCODONE BITARTRATE AND ACETAMINOPHEN 5; 325 MG/1; MG/1
1 TABLET ORAL EVERY 6 HOURS PRN
Status: DISCONTINUED | OUTPATIENT
Start: 2021-08-11 | End: 2021-08-12 | Stop reason: HOSPADM

## 2021-08-11 RX ADMIN — FENTANYL CITRATE 50 MCG: 50 INJECTION, SOLUTION INTRAMUSCULAR; INTRAVENOUS at 03:34

## 2021-08-11 RX ADMIN — HYDROCODONE BITARTRATE AND ACETAMINOPHEN 1 TABLET: 5; 325 TABLET ORAL at 18:53

## 2021-08-11 RX ADMIN — AMLODIPINE BESYLATE 10 MG: 10 TABLET ORAL at 09:51

## 2021-08-11 RX ADMIN — SODIUM CHLORIDE, PRESERVATIVE FREE 10 ML: 5 INJECTION INTRAVENOUS at 20:32

## 2021-08-11 RX ADMIN — FAMOTIDINE 10 MG: 20 TABLET, FILM COATED ORAL at 09:51

## 2021-08-11 RX ADMIN — FOLIC ACID 1 MG: 1 TABLET ORAL at 09:51

## 2021-08-11 RX ADMIN — FAMOTIDINE 10 MG: 20 TABLET, FILM COATED ORAL at 20:32

## 2021-08-11 RX ADMIN — ATORVASTATIN CALCIUM 40 MG: 40 TABLET, FILM COATED ORAL at 20:32

## 2021-08-11 RX ADMIN — LEVETIRACETAM 1000 MG: 10 INJECTION, SOLUTION INTRAVENOUS at 03:23

## 2021-08-11 RX ADMIN — Medication 100 MG: at 09:51

## 2021-08-11 RX ADMIN — ACETAMINOPHEN 650 MG: 325 TABLET ORAL at 23:16

## 2021-08-11 RX ADMIN — SODIUM CHLORIDE: 9 INJECTION, SOLUTION INTRAVENOUS at 09:53

## 2021-08-11 RX ADMIN — ACETAMINOPHEN 650 MG: 325 TABLET ORAL at 16:48

## 2021-08-11 RX ADMIN — ACETAMINOPHEN 1000 MG: 500 TABLET ORAL at 03:38

## 2021-08-11 RX ADMIN — SODIUM CHLORIDE, PRESERVATIVE FREE 10 ML: 5 INJECTION INTRAVENOUS at 09:51

## 2021-08-11 RX ADMIN — POTASSIUM CHLORIDE 40 MEQ: 1500 TABLET, EXTENDED RELEASE ORAL at 16:47

## 2021-08-11 ASSESSMENT — PAIN SCALES - GENERAL
PAINLEVEL_OUTOF10: 10
PAINLEVEL_OUTOF10: 8
PAINLEVEL_OUTOF10: 7
PAINLEVEL_OUTOF10: 10
PAINLEVEL_OUTOF10: 5
PAINLEVEL_OUTOF10: 3
PAINLEVEL_OUTOF10: 6
PAINLEVEL_OUTOF10: 10

## 2021-08-11 ASSESSMENT — PAIN DESCRIPTION - PAIN TYPE
TYPE: ACUTE PAIN

## 2021-08-11 ASSESSMENT — ENCOUNTER SYMPTOMS
GASTROINTESTINAL NEGATIVE: 1
ALLERGIC/IMMUNOLOGIC NEGATIVE: 1
RESPIRATORY NEGATIVE: 1
EYES NEGATIVE: 1

## 2021-08-11 ASSESSMENT — PAIN DESCRIPTION - LOCATION
LOCATION: HEAD

## 2021-08-11 ASSESSMENT — PAIN DESCRIPTION - DESCRIPTORS
DESCRIPTORS: SHARP;SHOOTING;ACHING
DESCRIPTORS: DISCOMFORT;HEADACHE
DESCRIPTORS: SHARP;ACHING
DESCRIPTORS: SHARP;STABBING

## 2021-08-11 ASSESSMENT — PAIN DESCRIPTION - ORIENTATION
ORIENTATION: RIGHT
ORIENTATION: RIGHT

## 2021-08-11 ASSESSMENT — PAIN DESCRIPTION - FREQUENCY
FREQUENCY: INTERMITTENT
FREQUENCY: CONTINUOUS
FREQUENCY: INTERMITTENT

## 2021-08-11 ASSESSMENT — PAIN DESCRIPTION - ONSET: ONSET: ON-GOING

## 2021-08-11 NOTE — CARE COORDINATION
Met with pt to discuss discharge planning/transition of care. Pt lives alone, independent, no DME. Per pt plan is to return home at discharge, his cousin will transport home. Dr. Bobby Cohen is PCP and [facility] aid in [facility] is pharmacy. Pt states he is up in room and ambulating to bathroom. Ольга Gomez, MSW, LSW

## 2021-08-11 NOTE — H&P
Hospitalist History & Physical      PCP: No primary care provider on file. Date of Admission: 8/11/2021    Date of Service: Pt seen/examined on 8/11/2021     Chief Complaint:  had concerns including Head Injury (states he struck his head on a car when attempting to get out x 4 days ago. states pain is on right side and radiates into his nek and back. ). History Of Present Illness:    Mr. Nacho Collins, a 39y.o. year old male  who  has a past medical history of ETOH abuse, GERD (gastroesophageal reflux disease), HLD (hyperlipidemia), HTN (hypertension), Polysubstance abuse ([address] and Tobacco abuse. Patient presented to the emergency department with some dizziness after falling. He felt and struck his head on the roof of his car. Did not have loss of consciousness. He has had a headache and dizziness since the injury. Vital signs within normal limits and stable. Labs notable for creatinine 2.6, BUN 27, potassium 3.1. CT head shows 2 mm right frontal parietal low-density extra-axial collection not present on 12/11/2020. Possible subdural hematoma with 3 mm of right-to-left midline shift. Past Medical History:   Diagnosis Date    ETOH abuse     GERD (gastroesophageal reflux disease)     HLD (hyperlipidemia)     HTN (hypertension)     Polysubstance abuse ([address]     Tobacco abuse        History reviewed. No pertinent surgical history. Prior to Admission medications    Medication Sig Start Date End Date Taking?  Authorizing Provider   atorvastatin (LIPITOR) 20 MG tablet Take 2 tablets by mouth daily 12/14/20   ALMA Lake NP   vitamin B-1 (THIAMINE) 100 MG tablet Take 1 tablet by mouth daily 12/14/20   ALMA Lake - NP   folic acid (FOLVITE) 1 MG tablet Take 1 tablet by mouth daily 12/14/20   ALMA Lake NP   ibuprofen (ADVIL;MOTRIN) 600 MG tablet Take 1 tablet by mouth 3 times daily as needed for Pain 12/14/20 12/17/20  ALMA Lake NP lisinopril (PRINIVIL;ZESTRIL) 10 MG tablet Take 10 mg by mouth daily    Historical Provider, MD   hydroCHLOROthiazide (HYDRODIURIL) 25 MG tablet Take 25 mg by mouth daily    Historical Provider, MD   famotidine (PEPCID) 20 MG tablet Take 20 mg by mouth 2 times daily    Historical Provider, MD   amLODIPine (NORVASC) 10 MG tablet Take 10 mg by mouth daily    Historical Provider, MD         Allergies:  Patient has no known allergies. Social History:    TOBACCO:   reports that he has been smoking cigarettes. He has been smoking about 1.00 pack per day. He has never used smokeless tobacco.  ETOH:   reports current alcohol use. Family History:    Reviewed in detail and negative for DM, CAD, Cancer, CVA. Positive as follows\"  History reviewed. No pertinent family history. REVIEW OF SYSTEMS:   Pertinent positives as noted in the HPI. All other systems reviewed and negative. PHYSICAL EXAM:  /69   Pulse 71   Temp 97.1 °F (36.2 °C) (Infrared)   Resp 16   Ht 5' 7\" (1.702 m)   Wt 180 lb (81.6 kg)   SpO2 99%   BMI 28.19 kg/m²   General appearance: No apparent distress, appears stated age and cooperative. HEENT: Normal cephalic, atraumatic without obvious deformity. Pupils equal, round, and reactive to light. Extra ocular muscles intact. Conjunctivae/corneas clear. Neck: Supple, with full range of motion. No jugular venous distention. Trachea midline. Respiratory:    Cardiovascular:    Abdomen:    Musculoskeletal: No clubbing, cyanosis, edema of bilateral lower extremities. Brisk capillary refill. Skin: Normal skin color. No rashes or lesions. Neurologic:  Neurovascularly intact without any focal sensory/motor deficits.  Cranial nerves: II-XII intact, grossly non-focal.      CBC:   Recent Labs     08/11/21 0221   WBC 11.2   RBC 4.48   HGB 15.0   HCT 43.6   MCV 97.3   RDW 13.2        BMP:   Recent Labs     08/11/21 0221      K 3.1*   CL 91*   CO2 28   BUN 27*   CREATININE 2.6* LFT:  No results for input(s): PROT, ALB, ALKPHOS, ALT, AST, BILITOT, AMYLASE, LIPASE in the last 72 hours. CE:  No results for input(s): Rafael Seed in the last 72 hours. PT/INR:   Recent Labs     08/11/21  0221   INR 0.9   APTT 30.3     BNP: No results for input(s): BNP in the last 72 hours. ESR: No results found for: SEDRATE  CRP: No results found for: CRP  D Dimer: No results found for: DDIMER   Folate and B12: No results found for: [id number], No results found for: FOLATE  Lactic Acid:   Lab Results   Component Value Date    LACTA 1.0 12/12/2020     Thyroid Studies: No results found for: TSH, [place] Oleg    Oupatient labs:  Lab Results   Component Value Date    CHOL 159 12/12/2020    TRIG 244 (H) 12/12/2020    HDL 42 12/12/2020    LDLCALC 68 12/12/2020    INR 0.9 08/11/2021       Urinalysis:    Lab Results   Component Value Date    NITRU Negative 12/11/2020    WBCUA 0-1 12/11/2020    BACTERIA RARE 12/11/2020    RBCUA NONE 12/11/2020    BLOODU TRACE-INTACT 12/11/2020    SPECGRAV >=1.030 12/11/2020    GLUCOSEU Negative 12/11/2020       Imaging:  XR LUMBAR SPINE (MIN 4 VIEWS)    Result Date: 8/11/2021  EXAMINATION: XRAY VIEWS OF THE LUMBAR SPINE 8/11/2021 1:37 am COMPARISON: None. HISTORY: ORDERING SYSTEM PROVIDED HISTORY: trauma/pain TECHNOLOGIST PROVIDED HISTORY: Reason for exam:->trauma/pain What reading provider will be dictating this exam?->CRC FINDINGS: Lumbar vertebral bodies are normal in height and alignment. No evidence of fracture. Visualized sacrum is unremarkable. No significant degenerative changes. Unremarkable examination of the lumbar spine.      CT HEAD WO CONTRAST    Result Date: 8/11/2021  EXAMINATION: CT OF THE HEAD WITHOUT CONTRAST  8/11/2021 1:24 am TECHNIQUE: CT of the head was performed without the administration of intravenous contrast. Dose modulation, iterative reconstruction, and/or weight based adjustment of the mA/kV was utilized to reduce the radiation dose to as low as reasonably achievable. COMPARISON: 12/11/2020 HISTORY: ORDERING SYSTEM PROVIDED HISTORY: head injury TECHNOLOGIST PROVIDED HISTORY: Has a \"code stroke\" or \"stroke alert\" been called? ->No Reason for exam:->head injury Decision Support Exception - unselect if not a suspected or confirmed emergency medical condition->Emergency Medical Condition (MA) What reading provider will be dictating this exam?->CRC FINDINGS: BRAIN/VENTRICLES: 2 mm right frontal parietal low-density extra-axial collection not present on 12/11/2020. Findings may represent hygroma/chronic subdural hematoma. 3 mm of right to left midline shift. The gray-white differentiation is maintained without evidence of an acute infarct. There is no evidence of hydrocephalus. ORBITS: The visualized portion of the orbits demonstrate no acute abnormality. SINUSES: The visualized paranasal sinuses and mastoid air cells demonstrate no acute abnormality. SOFT TISSUES/SKULL:  No acute abnormality of the visualized skull or soft tissues. 2 mm right frontal parietal low-density extra-axial collection not present on 12/11/2020. Findings may represent hygromas/chronic subdural hematoma with 3 mm of right to left midline shift.  Findings discussed on 08/11/2021 with Dr. Eve Parr at       ASSESSMENT:  Subdural hematoma  Acute renal failure  Hyperlipidemia  Hypertension      PLAN:  Admit to medicine  Consult neurosurgery  Consult nephrology  Monitor renal function avoid nephrotoxic medications  IV fluids  Neuro checks  PT/OT  Continue home medications        Diet: Diet NPO  Code Status: Full Code  Surrogate decision maker confirmed with patient:   Extended Emergency Contact Information  Primary Emergency Contact: [facility], [address] [place] [address] Road Phone: [phone]  Mobile Phone: [phone]  Relation: Child  Secondary Emergency Contact: [address], [address] [facility] Phone: [phone]  Relation: Parent    DVT Prophylaxis: []Lovenox []Heparin []PCD [] [address] []Encouraged ambulation  Disposition: []Med/Surg [] Intermediate [] ICU/CCU  Admit status: [] Observation [] Inpatient     +++++++++++++++++++++++++++++++++++++++++++++++++  Malachi Scott DO  [address]  +++++++++++++++++++++++++++++++++++++++++++++++++  NOTE: This report was transcribed using voice recognition software. Every effort was made to ensure accuracy; however, inadvertent computerized transcription errors may be present.
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
## 2021-08-11 NOTE — PROGRESS NOTES
SPEECH/LANGUAGE PATHOLOGY  SPEECH/LANGUAGE/COGNITIVE EVALUATION   and PLAN OF CARE      PATIENT NAME:  Fermin Mcclain  (male)     MRN:  [id number]    :  1980  (39 y.o.)  STATUS:  Inpatient: Room 7413/7413-A    TODAY'S DATE:  2021  REFERRING PROVIDER:    Dr. Flores Mate: speech language pathology (SLP) eval and treat  Date of order:  21  REASON FOR REFERRAL: trauma  EVALUATING THERAPIST: BALDO Wood    ADMITTING DIAGNOSIS: Subdural hematoma ([facility] [S03.1B4U]  SDH (subdural hematoma) ([facility] [R13.4W8V]    VISIT DIAGNOSIS:   Visit Diagnoses       Codes    SDH (subdural hematoma) ([facility]    -  Primary S06.5X9A           SPEECH THERAPY  PLAN OF CARE   The speech therapy  POC is established based on physician order, speech pathology diagnosis and results of clinical assessment     SPEECH PATHOLOGY DIAGNOSIS:    Within function limits    Speech Pathology intervention is not warranted at this time. Conditions Requiring Skilled Therapeutic Intervention for speech, language and/or cognition  Not applicable     Specific Speech Therapy Interventions to Include:   Not applicable    Specific instructions for next treatment:    Not applicable    SHORT/LONG TERM GOALS  Not applicable      Patient goals: Patient/family involved in developing goals and treatment plan:   Treatment goals discussed with Patient    The Patient understand(s) the diagnosis, prognosis and plan of care   The patient/family Agreed with above,     This plan may be re-evaluated and revised as warranted. Rehabilitation Potential/Prognosis: not applicable                CLINICAL ASSESSMENT:  MOTOR SPEECH       Oral Peripheral Examination   Adequate lingual/labial strength     Parameters of Speech Production  Respiration:  Adequate for speech production  Articulation:  Within functional limits  Resonance:  Within functional limits  Quality:   Within functional limits  Pitch:     Within functional limits  Intensity: Within functional limits  Fluency:  Intact  Prosody Intact    RECEPTIVE LANGUAGE    Comprehension of Yes/No Questions: Within functional limits    Process  Simple Verbal Commands:   Within functional limits  Process Intermediate Verbal Commands:   Within functional limits  Process Complex Verbal Commands:     Within functional limits    Comprehension of Conversation:      Within functional limits      EXPRESSIVE LANGUAGE     Serials: Functional    Imitation:  Words   Functional   Sentences Functional    Naming:  (Modality used:  Verbal)  Confrontation Naming  Functional  Functional Description  Functional  Response Naming: Functional    Conversation:      Conversation was within functional limits    COGNITION     Attention/Orientation  Attention: Sustained attention   Orientation:  Oriented to Person, Place, Date, Reason for hospitalization    Memory   Immediate Recall: Repeated 3/3    Delayed Recall:   Recalled  2/3  Long Term Recall:   Recalled Address, Birthdate, Age, and Family    Organization/Problem Solving/Reasoning   Verbal Sequencing:   Functional        Verbal Problem solving:   Functional          CLINICAL OBSERVATIONS NOTED DURING THE EVALUATION  Latent responses due to cooperation                    EDUCATION:   The Speech Language Pathologist (SLP) completed education regarding results of evaluation and that intervention is not warranted at this time. Learner: Patient  Education: Reviewed results and recommendations of this evaluation  Evaluation of Education:  Verbalizes understanding    Evaluation Time includes thorough review of current medical information, gathering information on past medical history/social history and prior level of function, completion of standardized testing/informal observation of tasks, assessment of data and education on plan of care and goals.       CPT code:    18045  eval speech sound lang comprehension      The admitting diagnosis and active problem list, as listed below have been reviewed prior to initiation of this evaluation.         ACTIVE PROBLEM LIST:   Patient Active Problem List   Diagnosis    Overdose of illicit drug ([facility]    Toxic encephalopathy    Alcohol intoxication ([address]    Hypokalemia    Tobacco dependence    Polysubstance abuse ([facility] [address]    Alcohol abuse    HTN (hypertension)    HLD (hyperlipidemia)    GERD (gastroesophageal reflux disease)    Intractable vomiting    Accidental overdose of heroin ([facility] [address]    EKG, abnormal    Tobacco abuse    Alcohol intoxication delirium ([address]    Pneumonia    Cephalgia    Lactic acidosis    Subdural hematoma ([address]
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
## 2021-08-12 ENCOUNTER — APPOINTMENT (OUTPATIENT)
Dept: CT IMAGING | Age: 41
DRG: 055 | End: 2021-08-12
Payer: COMMERCIAL

## 2021-08-12 ENCOUNTER — APPOINTMENT (OUTPATIENT)
Dept: ULTRASOUND IMAGING | Age: 41
DRG: 055 | End: 2021-08-12
Payer: COMMERCIAL

## 2021-08-12 VITALS
RESPIRATION RATE: 18 BRPM | BODY MASS INDEX: 28.25 KG/M2 | HEART RATE: 62 BPM | OXYGEN SATURATION: 99 % | HEIGHT: 67 IN | TEMPERATURE: 97.8 F | SYSTOLIC BLOOD PRESSURE: 137 MMHG | DIASTOLIC BLOOD PRESSURE: 87 MMHG | WEIGHT: 180 LBS

## 2021-08-12 LAB
ANION GAP SERPL CALCULATED.3IONS-SCNC: 9 MMOL/L (ref 7–16)
BUN BLDV-MCNC: 15 MG/DL (ref 6–20)
CALCIUM SERPL-MCNC: 8.9 MG/DL (ref 8.6–10.2)
CHLORIDE BLD-SCNC: 103 MMOL/L (ref 98–107)
CO2: 28 MMOL/L (ref 22–29)
CREAT SERPL-MCNC: 1 MG/DL (ref 0.7–1.2)
EOSINOPHIL, URINE: 0 % (ref 0–1)
GFR AFRICAN AMERICAN: >60
GFR NON-AFRICAN AMERICAN: >60 ML/MIN/1.73
GLUCOSE BLD-MCNC: 141 MG/DL (ref 74–99)
HCT VFR BLD CALC: 39.4 % (ref 37–54)
HEMOGLOBIN: 13.2 G/DL (ref 12.5–16.5)
MCH RBC QN AUTO: 33.7 PG (ref 26–35)
MCHC RBC AUTO-ENTMCNC: 33.5 % (ref 32–34.5)
MCV RBC AUTO: 100.5 FL (ref 80–99.9)
PDW BLD-RTO: 13.2 FL (ref 11.5–15)
PLATELET # BLD: 302 E9/L (ref 130–450)
PMV BLD AUTO: 11.1 FL (ref 7–12)
POTASSIUM SERPL-SCNC: 3.6 MMOL/L (ref 3.5–5)
RBC # BLD: 3.92 E12/L (ref 3.8–5.8)
SODIUM BLD-SCNC: 140 MMOL/L (ref 132–146)
WBC # BLD: 7.4 E9/L (ref 4.5–11.5)

## 2021-08-12 PROCEDURE — 70450 CT HEAD/BRAIN W/O DYE: CPT

## 2021-08-12 PROCEDURE — 80048 BASIC METABOLIC PNL TOTAL CA: CPT

## 2021-08-12 PROCEDURE — 76770 US EXAM ABDO BACK WALL COMP: CPT

## 2021-08-12 PROCEDURE — 6370000000 HC RX 637 (ALT 250 FOR IP): Performed by: FAMILY MEDICINE

## 2021-08-12 PROCEDURE — 85027 COMPLETE CBC AUTOMATED: CPT

## 2021-08-12 PROCEDURE — 36415 COLL VENOUS BLD VENIPUNCTURE: CPT

## 2021-08-12 PROCEDURE — 2580000003 HC RX 258: Performed by: FAMILY MEDICINE

## 2021-08-12 RX ADMIN — HYDROCODONE BITARTRATE AND ACETAMINOPHEN 1 TABLET: 5; 325 TABLET ORAL at 12:24

## 2021-08-12 RX ADMIN — HYDROCODONE BITARTRATE AND ACETAMINOPHEN 1 TABLET: 5; 325 TABLET ORAL at 00:55

## 2021-08-12 RX ADMIN — SODIUM CHLORIDE: 9 INJECTION, SOLUTION INTRAVENOUS at 09:53

## 2021-08-12 RX ADMIN — FAMOTIDINE 10 MG: 20 TABLET, FILM COATED ORAL at 09:51

## 2021-08-12 RX ADMIN — ACETAMINOPHEN 650 MG: 325 TABLET ORAL at 09:51

## 2021-08-12 RX ADMIN — ACETAMINOPHEN 650 MG: 325 TABLET ORAL at 05:05

## 2021-08-12 RX ADMIN — AMLODIPINE BESYLATE 10 MG: 10 TABLET ORAL at 09:51

## 2021-08-12 RX ADMIN — Medication 100 MG: at 09:51

## 2021-08-12 RX ADMIN — FOLIC ACID 1 MG: 1 TABLET ORAL at 09:51

## 2021-08-12 RX ADMIN — HYDROCODONE BITARTRATE AND ACETAMINOPHEN 1 TABLET: 5; 325 TABLET ORAL at 06:53

## 2021-08-12 ASSESSMENT — PAIN DESCRIPTION - LOCATION: LOCATION: HEAD

## 2021-08-12 ASSESSMENT — PAIN SCALES - GENERAL
PAINLEVEL_OUTOF10: 9
PAINLEVEL_OUTOF10: 9
PAINLEVEL_OUTOF10: 8
PAINLEVEL_OUTOF10: 8
PAINLEVEL_OUTOF10: 9

## 2021-08-12 ASSESSMENT — PAIN DESCRIPTION - PAIN TYPE: TYPE: ACUTE PAIN

## 2021-08-12 ASSESSMENT — PAIN DESCRIPTION - ORIENTATION: ORIENTATION: RIGHT

## 2021-08-12 NOTE — PLAN OF CARE
Problem: Pain:  Goal: Pain level will decrease  Description: Pain level will decrease  8/12/2021 0053 by Mavis Kelsey RN  Outcome: Ongoing     Problem: Pain:  Goal: Control of acute pain  Description: Control of acute pain  8/12/2021 0053 by Mavis Kelsey RN  Outcome: Ongoing     Problem: Pain:  Goal: Control of chronic pain  Description: Control of chronic pain  8/12/2021 0053 by Mavis Kelsey RN  Outcome: Ongoing
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
## 2021-08-12 NOTE — PROGRESS NOTES
Occupational Therapy  Date:2021  Patient Name: Leeanne Eng  MRN: [id number]  : 1980  Room: [address] [place] [address]-A    OT consult received. Chart reviewed. Education provided regarding the purpose and benefits of skilled OT. Patient states he is at functional baseline with ADLs and functional mobility reporting no concerns regarding return to prior living situation. Patient declined OT evaluation and stated his son is available to assist as needed upon discharge. Therefore, no skilled OT indicated. OT screen only. Will discontinue OT orders. Please re-consult if indicated.  JACKIE Jewell, OTR/L #[id number]

## 2021-08-12 NOTE — PROGRESS NOTES
Nephrology Progress Note  [facility]      CC:   CHRISTEN    From 8/11 Note: HPI:  \" Michelle Albarran is a 39year old male we were asked to see regarding CHRISTEN. He denies any past history of CKD, baseline creatinine 0.9. He does have a history of hypertension, ETOH abuse, GERD, polysubstance abuse and HLD. He came to the ED 8/10 for head injury which occured 4 day(s) prior to arrival. The complaint is due to striking head on roof of car. Loss of consciousness did not occur. The injury has been associated with headache and dizziness, as well as nausea and vomiting. He was additionally taking lisinopril, HCTZ and NSAIDS prior to admission. He was found to have a left 2 cm SDH on CT and was admitted for further care and work up. \"    Interval Note:     8/12/21:     PMH:    Past Medical History:   Diagnosis Date    ETOH abuse     GERD (gastroesophageal reflux disease)     HLD (hyperlipidemia)     HTN (hypertension)     Polysubstance abuse ([address]     Tobacco abuse        Patient Active Problem List   Diagnosis    Overdose of illicit drug ([address]    Toxic encephalopathy    Alcohol intoxication ([address]    Hypokalemia    Tobacco dependence    Polysubstance abuse ([address]    Alcohol abuse    HTN (hypertension)    HLD (hyperlipidemia)    GERD (gastroesophageal reflux disease)    Intractable vomiting    Accidental overdose of heroin ([address]    EKG, abnormal    Tobacco abuse    Alcohol intoxication delirium (HCC)    Pneumonia    Cephalgia    Lactic acidosis    Subdural hematoma (HCC)       Meds:     atorvastatin  40 mg Oral Daily    famotidine  10 mg Oral BID    folic acid  1 mg Oral Daily    vitamin B-1  100 mg Oral Daily    amLODIPine  10 mg Oral Daily    sodium chloride flush  5-40 mL Intravenous 2 times per day        sodium chloride 100 mL/hr at 08/11/21 0953    sodium chloride         Meds prn:     sodium chloride flush, sodium chloride flush, sodium chloride, acetaminophen, ondansetron **OR** distress  Skin: No rashes or lesions on exposed skin  Neck: No JVD  Lungs: Clear, no adventitious sounds  Heart: RRR, no rub  Abdomen: Soft, non-tender, + bowel sounds  Extremities: No edema  Neurologic: Mental status: Alert, oriented, thought content appropriate    Data:    Recent Labs     08/11/21 0221   WBC 11.2   HGB 15.0   HCT 43.6   MCV 97.3          Recent Labs     08/11/21 0221 08/11/21  1142    136   K 3.1* 3.1*   CL 91* 95*   CO2 28 26   CREATININE 2.6* 2.1*   BUN 27* 24*   LABGLOM 33 42   GLUCOSE 109* 87   CALCIUM 9.0 9.0       No results found for: VITD25    No results found for: PTH    No results for input(s): ALT, AST, ALKPHOS, BILITOT, BILIDIR in the last 72 hours. No results for input(s): LABALBU in the last 72 hours. No results found for: FERRITIN, IRON, TIBC    No results found for: QFTTVBRI88    No results found for: FOLATE      Lab Results   Component Value Date    COLORU Yellow 08/11/2021    NITRU Negative 08/11/2021    GLUCOSEU Negative 08/11/2021    KETUA Negative 08/11/2021    UROBILINOGEN 0.2 08/11/2021    BILIRUBINUR Negative 08/11/2021       No results found for: SOFIE, CREURRAN, MACREATRATIO, OSMOU    No components found for: URIC    No results found for: LIPIDPAN      Assessment and Plan:    1. Acute kidney injury-  Likely prerenal in the setting of N/V, ACE/HCTZ and NSAID  Baseline creatinine 0.9  IVF have been started   Avoid NSAIDS  Check urine studies, renal US, PVR, urine eos    2. Hypertension-  At goal <140/90  Holding on lisinopril and HCTZ    3. SDH-  Neurosurgery without plans for surgical intervention    4.  Hypokalemia  Replace prn      ALMA Deng - CNS     Pt seen and examined agree with above  Holding acei hctz nsaids  Replace k prn  hydration  ALMA Deng - CNS

## 2021-08-13 ENCOUNTER — APPOINTMENT (OUTPATIENT)
Dept: CT IMAGING | Age: 41
End: 2021-08-13
Payer: COMMERCIAL

## 2021-08-13 ENCOUNTER — HOSPITAL ENCOUNTER (EMERGENCY)
Age: 41
Discharge: HOME | End: 2021-08-13
Attending: EMERGENCY MEDICINE
Payer: COMMERCIAL

## 2021-08-13 VITALS
WEIGHT: 180 LBS | DIASTOLIC BLOOD PRESSURE: 87 MMHG | SYSTOLIC BLOOD PRESSURE: 140 MMHG | HEIGHT: 67 IN | RESPIRATION RATE: 15 BRPM | OXYGEN SATURATION: 98 % | TEMPERATURE: 98.4 F | BODY MASS INDEX: 28.25 KG/M2 | HEART RATE: 72 BPM

## 2021-08-13 DIAGNOSIS — R51.9 HEADACHE, UNSPECIFIED HEADACHE TYPE: Primary | ICD-10-CM

## 2021-08-13 DIAGNOSIS — S06.5XAA SDH (SUBDURAL HEMATOMA): Primary | ICD-10-CM

## 2021-08-13 DIAGNOSIS — Z86.79 HISTORY OF SUBDURAL HEMATOMA: ICD-10-CM

## 2021-08-13 LAB
ALBUMIN SERPL-MCNC: 3.8 G/DL (ref 3.5–5.2)
ALP BLD-CCNC: 64 U/L (ref 40–129)
ALT SERPL-CCNC: 8 U/L (ref 0–40)
AMPHETAMINE SCREEN, URINE: NOT DETECTED
ANION GAP SERPL CALCULATED.3IONS-SCNC: 8 MMOL/L (ref 7–16)
AST SERPL-CCNC: 11 U/L (ref 0–39)
BARBITURATE SCREEN URINE: NOT DETECTED
BASOPHILS ABSOLUTE: 0.02 E9/L (ref 0–0.2)
BASOPHILS RELATIVE PERCENT: 0.2 % (ref 0–2)
BENZODIAZEPINE SCREEN, URINE: NOT DETECTED
BILIRUB SERPL-MCNC: <0.2 MG/DL (ref 0–1.2)
BUN BLDV-MCNC: 10 MG/DL (ref 6–20)
CALCIUM SERPL-MCNC: 9.2 MG/DL (ref 8.6–10.2)
CANNABINOID SCREEN URINE: POSITIVE
CHLORIDE BLD-SCNC: 97 MMOL/L (ref 98–107)
CO2: 31 MMOL/L (ref 22–29)
COCAINE METABOLITE SCREEN URINE: NOT DETECTED
CREAT SERPL-MCNC: 0.9 MG/DL (ref 0.7–1.2)
EOSINOPHILS ABSOLUTE: 0.04 E9/L (ref 0.05–0.5)
EOSINOPHILS RELATIVE PERCENT: 0.5 % (ref 0–6)
FENTANYL SCREEN, URINE: NOT DETECTED
GFR AFRICAN AMERICAN: >60
GFR NON-AFRICAN AMERICAN: >60 ML/MIN/1.73
GLUCOSE BLD-MCNC: 113 MG/DL (ref 74–99)
HCT VFR BLD CALC: 40.2 % (ref 37–54)
HEMOGLOBIN: 13.4 G/DL (ref 12.5–16.5)
IMMATURE GRANULOCYTES #: 0.02 E9/L
IMMATURE GRANULOCYTES %: 0.2 % (ref 0–5)
LYMPHOCYTES ABSOLUTE: 2.06 E9/L (ref 1.5–4)
LYMPHOCYTES RELATIVE PERCENT: 24.8 % (ref 20–42)
Lab: ABNORMAL
MCH RBC QN AUTO: 33.3 PG (ref 26–35)
MCHC RBC AUTO-ENTMCNC: 33.3 % (ref 32–34.5)
MCV RBC AUTO: 100 FL (ref 80–99.9)
METHADONE SCREEN, URINE: NOT DETECTED
MONOCYTES ABSOLUTE: 1 E9/L (ref 0.1–0.95)
MONOCYTES RELATIVE PERCENT: 12 % (ref 2–12)
NEUTROPHILS ABSOLUTE: 5.18 E9/L (ref 1.8–7.3)
NEUTROPHILS RELATIVE PERCENT: 62.3 % (ref 43–80)
OPIATE SCREEN URINE: POSITIVE
OXYCODONE URINE: NOT DETECTED
PDW BLD-RTO: 13 FL (ref 11.5–15)
PHENCYCLIDINE SCREEN URINE: NOT DETECTED
PLATELET # BLD: 339 E9/L (ref 130–450)
PMV BLD AUTO: 11 FL (ref 7–12)
POTASSIUM SERPL-SCNC: 3.3 MMOL/L (ref 3.5–5)
RBC # BLD: 4.02 E12/L (ref 3.8–5.8)
SODIUM BLD-SCNC: 136 MMOL/L (ref 132–146)
TOTAL PROTEIN: 7.2 G/DL (ref 6.4–8.3)
WBC # BLD: 8.3 E9/L (ref 4.5–11.5)

## 2021-08-13 PROCEDURE — 85025 COMPLETE CBC W/AUTO DIFF WBC: CPT

## 2021-08-13 PROCEDURE — 80053 COMPREHEN METABOLIC PANEL: CPT

## 2021-08-13 PROCEDURE — 99284 EMERGENCY DEPT VISIT MOD MDM: CPT

## 2021-08-13 PROCEDURE — 96375 TX/PRO/DX INJ NEW DRUG ADDON: CPT

## 2021-08-13 PROCEDURE — 70450 CT HEAD/BRAIN W/O DYE: CPT

## 2021-08-13 PROCEDURE — 96374 THER/PROPH/DIAG INJ IV PUSH: CPT

## 2021-08-13 PROCEDURE — 80307 DRUG TEST PRSMV CHEM ANLYZR: CPT

## 2021-08-13 PROCEDURE — 6360000002 HC RX W HCPCS: Performed by: EMERGENCY MEDICINE

## 2021-08-13 RX ORDER — METOCLOPRAMIDE HYDROCHLORIDE 5 MG/ML
10 INJECTION INTRAMUSCULAR; INTRAVENOUS ONCE
Status: COMPLETED | OUTPATIENT
Start: 2021-08-13 | End: 2021-08-13

## 2021-08-13 RX ORDER — DIPHENHYDRAMINE HYDROCHLORIDE 50 MG/ML
25 INJECTION INTRAMUSCULAR; INTRAVENOUS ONCE
Status: COMPLETED | OUTPATIENT
Start: 2021-08-13 | End: 2021-08-13

## 2021-08-13 RX ADMIN — DIPHENHYDRAMINE HYDROCHLORIDE 25 MG: 50 INJECTION, SOLUTION INTRAMUSCULAR; INTRAVENOUS at 03:44

## 2021-08-13 RX ADMIN — METOCLOPRAMIDE HYDROCHLORIDE 10 MG: 5 INJECTION INTRAMUSCULAR; INTRAVENOUS at 03:44

## 2021-08-13 ASSESSMENT — PAIN DESCRIPTION - LOCATION: LOCATION: HEAD

## 2021-08-13 ASSESSMENT — PAIN DESCRIPTION - ORIENTATION: ORIENTATION: RIGHT

## 2021-08-13 ASSESSMENT — PAIN DESCRIPTION - FREQUENCY: FREQUENCY: CONTINUOUS

## 2021-08-13 ASSESSMENT — PAIN DESCRIPTION - PAIN TYPE: TYPE: ACUTE PAIN

## 2021-08-13 ASSESSMENT — PAIN DESCRIPTION - DESCRIPTORS: DESCRIPTORS: SHARP

## 2021-08-13 ASSESSMENT — PAIN SCALES - GENERAL: PAINLEVEL_OUTOF10: 10

## 2021-08-13 NOTE — ED NOTES
FIRST PROVIDER CONTACT ASSESSMENT NOTE                                                                                                Department of Emergency Medicine                                                      First Provider Note  21  2:14 AM EDT  NAME: Francesco Aldana  : 1980  MRN: [id number]    Chief Complaint: Headache (right sided head pain. pt had a recent SDH and now the pain is increaed and sharper then when he was discharged. per pt tylenol did not help. )      History of Present Illness:   Francesco Aldana is a 39 y.o. male who presents to the ED for return of headache to the right forehead parietal aspect. Patient was actually just discharged yesterday , he was admitted for less than 24 hours, he does have a history significant for subdural hematoma. He reports when he got discharged he was walking down the street to go to the store states that he still with a headache he reports throwing up 2 times. He denies any fall or injury or trauma. Patient reports otherwise being in normal state of health. He denies any fevers, no chest pain or shortness of breath no abdominal pain. Focused Physical Exam:  VS:    ED Triage Vitals   BP Temp Temp Source Pulse Resp SpO2 Height Weight   21 0210 21 0056 21 0056 21 0056 21 0210 21 0056 21 0210 21 0210   (!) 157/107 97 °F (36.1 °C) Infrared 70 17 98 % 5' 7\" (1.702 m) 180 lb (81.6 kg)        General: Alert and in no apparent distress. Medical History:  has a past medical history of ETOH abuse, GERD (gastroesophageal reflux disease), HLD (hyperlipidemia), HTN (hypertension), [address]ysubstance abuse [address] [address] and Tobacco abuse. Surgical History:  has no past surgical history on file. Social History:  reports that he has been smoking cigarettes. He has been smoking about 1.00 pack per day. He has never used smokeless tobacco. He reports current alcohol use. He reports current drug use. Drugs: IV, Cocaine, and Marijuana. Family History: family history is not on file. Allergies: Patient has no known allergies.      Initial Plan of Care:  Initiate Treatment-Testing, Proceed toTreatment Area When Bed Available for ED Attending/MLP to Continue Care    -------------------------------------------------END OF FIRST PROVIDER CONTACT ASSESSMENT NOTE--------------------------------------------------------  Electronically signed by ALMA Campos CNP   DD: 8/13/21         ALMA Campos CNP  08/13/21 0215

## 2021-08-13 NOTE — ED PROVIDER NOTES
HPI:  8/13/21, Time: 2:56 AM EDT         Geri Menon is a 39 y.o. male presenting to the ED for headache, beginning 4 days ago. The complaint has been persistent, moderate in severity, and worsened by nothing. Reports that he had fallen several months ago. Patient reporting headaches have been going on for last 4 days he was recently admitted and treated and released. Patient was diagnosed with subdural hematoma. Patient reporting no chest pains or shortness of breath he did report that he vomited several times after being discharged yesterday. Patient reporting no paralysis. There is no slurred speech. He reports no recent trauma. Patient reporting no numbness or tingling. ROS:   Pertinent positives and negatives are stated within HPI, all other systems reviewed and are negative.  --------------------------------------------- PAST HISTORY ---------------------------------------------  Past Medical History:  has a past medical history of ETOH abuse, GERD (gastroesophageal reflux disease), HLD (hyperlipidemia), HTN (hypertension), Polysubstance abuse ([facility] [address] and Tobacco abuse. Past Surgical History:  has no past surgical history on file. Social History:  reports that he has been smoking cigarettes. He has been smoking about 1.00 pack per day. He has never used smokeless tobacco. He reports current alcohol use. He reports current drug use. Drugs: IV, Cocaine, and Marijuana. Family History: family history is not on file. The patients home medications have been reviewed. Allergies: Patient has no known allergies.     ---------------------------------------------------PHYSICAL EXAM--------------------------------------    Constitutional/General: Alert and oriented x3, well appearing, non toxic in NAD  Head: Normocephalic and atraumatic  Eyes: PERRL, EOMI  Mouth: Oropharynx clear, handling secretions, no trismus  Neck: Supple, full ROM, non tender to palpation in the midline, no stridor, no crepitus, no meningeal signs  Pulmonary: Lungs clear to auscultation bilaterally, no wheezes, rales, or rhonchi. Not in respiratory distress  Cardiovascular:  Regular rate. Regular rhythm. No murmurs, gallops, or rubs. 2+ distal pulses  Chest: no chest wall tenderness  Abdomen: Soft. Non tender. Non distended. +BS. No rebound, guarding, or rigidity. No pulsatile masses appreciated. Musculoskeletal: Moves all extremities x 4. Warm and well perfused, no clubbing, cyanosis, or edema. Capillary refill <3 seconds  Skin: warm and dry. No rashes. Neurologic: GCS 15, CN 2-12 grossly intact, no focal deficits, symmetric strength 5/5 in the upper and lower extremities bilaterally  Psych: Normal Affect    -------------------------------------------------- RESULTS -------------------------------------------------  I have personally reviewed all laboratory and imaging results for this patient. Results are listed below.      LABS:  Results for orders placed or performed during the hospital encounter of 08/13/21   CBC auto differential   Result Value Ref Range    WBC 8.3 4.5 - 11.5 E9/L    RBC 4.02 3.80 - 5.80 E12/L    Hemoglobin 13.4 12.5 - 16.5 g/dL    Hematocrit 40.2 37.0 - 54.0 %    .0 (H) 80.0 - 99.9 fL    MCH 33.3 26.0 - 35.0 pg    MCHC 33.3 32.0 - 34.5 %    RDW 13.0 11.5 - 15.0 fL    Platelets 068 730 - 930 E9/L    MPV 11.0 7.0 - 12.0 fL    Neutrophils % 62.3 43.0 - 80.0 %    Immature Granulocytes % 0.2 0.0 - 5.0 %    Lymphocytes % 24.8 20.0 - 42.0 %    Monocytes % 12.0 2.0 - 12.0 %    Eosinophils % 0.5 0.0 - 6.0 %    Basophils % 0.2 0.0 - 2.0 %    Neutrophils Absolute 5.18 1.80 - 7.30 E9/L    Immature Granulocytes # 0.02 E9/L    Lymphocytes Absolute 2.06 1.50 - 4.00 E9/L    Monocytes Absolute 1.00 (H) 0.10 - 0.95 E9/L    Eosinophils Absolute 0.04 (L) 0.05 - 0.50 E9/L    Basophils Absolute 0.02 0.00 - 0.20 E9/L   Comprehensive Metabolic Panel   Result Value Ref Range    Sodium 136 132 - 146 mmol/L    Potassium 3.3 (L) 3.5 - 5.0 mmol/L    Chloride 97 (L) 98 - 107 mmol/L    CO2 31 (H) 22 - 29 mmol/L    Anion Gap 8 7 - 16 mmol/L    Glucose 113 (H) 74 - 99 mg/dL    BUN 10 6 - 20 mg/dL    CREATININE 0.9 0.7 - 1.2 mg/dL    GFR Non-African American >60 >=60 mL/min/1.73    GFR African American >60     Calcium 9.2 8.6 - 10.2 mg/dL    Total Protein 7.2 6.4 - 8.3 g/dL    Albumin 3.8 3.5 - 5.2 g/dL    Total Bilirubin <0.2 0.0 - 1.2 mg/dL    Alkaline Phosphatase 64 40 - 129 U/L    ALT 8 0 - 40 U/L    AST 11 0 - 39 U/L   Urine Drug Screen   Result Value Ref Range    Amphetamine Screen, Urine NOT DETECTED Negative <1000 ng/mL    Barbiturate Screen, Ur NOT DETECTED Negative < 200 ng/mL    Benzodiazepine Screen, Urine NOT DETECTED Negative < 200 ng/mL    Cannabinoid Scrn, Ur POSITIVE (A) Negative < 50ng/mL    Cocaine Metabolite Screen, Urine NOT DETECTED Negative < 300 ng/mL    Opiate Scrn, Ur POSITIVE (A) Negative < 300ng/mL    PCP Screen, Urine NOT DETECTED Negative < 25 ng/mL    Methadone Screen, Urine NOT DETECTED Negative <300 ng/mL    Oxycodone Urine NOT DETECTED Negative <100 ng/mL    FENTANYL SCREEN, URINE NOT DETECTED Negative <1 ng/mL    Drug Screen Comment: see below        RADIOLOGY:  Interpreted by Radiologist.  CT HEAD WO CONTRAST   Final Result   Stable appearance of small right subdural hemorrhage and mild leftward   midline shift.                     ------------------------- NURSING NOTES AND VITALS REVIEWED ---------------------------   The nursing notes within the ED encounter and vital signs as below have been reviewed by myself. BP (!) 151/96   Pulse 88   Temp 97.3 °F (36.3 °C) (Temporal)   Resp 16   Ht 5' 7\" (1.702 m)   Wt 180 lb (81.6 kg)   SpO2 98%   BMI 28.19 kg/m²   Oxygen Saturation Interpretation: Normal    The patients available past medical records and past encounters were reviewed.         ------------------------------ ED COURSE/MEDICAL DECISION MAKING----------------------  Medications metoclopramide (REGLAN) injection 10 mg (10 mg Intravenous Given 8/13/21 0344)   diphenhydrAMINE (BENADRYL) injection 25 mg (25 mg Intravenous Given 8/13/21 0344)             Medical Decision Making:    Patient presenting here because of headache. Patient reports that he was recently admitted he had a subdural hematoma. Patient presenting here because he still has headache and reportedly vomited at home. Patient reporting no chest pain no difficulty breathing reports no paralysis. Patient reporting no visual changes. Patient gait is steady and normal here pupils are equal and reactive. Patient medicated here with improvement symptom CT noted and looks unchanged from his prior CT. Patient will be discharged home. Patient made aware of need for follow-up with neurosurgery in clinic. Re-Evaluations:             Re-evaluation. Patients symptoms show no change    Patient reevaluated and improved after being given Reglan Benadryl. Patient neurologically intact here. Gait was steady and normal on my initial exam.  Patient made aware of findings need for follow-up need to return if symptoms worsen or persist  Consultations:         I did speak to [facility] and reviewed CT findings and she did review CT as well patient CT is unchanged from prior. Patient will be discharged home she is to follow-up with him in clinic. Critical Care: This patient's ED course included: a personal history and physicial eaxmination    This patient has been closely monitored during their ED course. Counseling: The emergency provider has spoken with the patient and discussed todays results, in addition to providing specific details for the plan of care and counseling regarding the diagnosis and prognosis. Questions are answered at this time and they are agreeable with the plan.       --------------------------------- IMPRESSION AND DISPOSITION ---------------------------------    IMPRESSION  1.  Headache, unspecified headache type    2. History of subdural hematoma        DISPOSITION  Disposition: Discharge home  Patient condition is stable        NOTE: This report was transcribed using voice recognition software.  Every effort was made to ensure accuracy; however, inadvertent computerized transcription errors may be present          Danni Braxton MD  08/13/21 Kuldeep Castano MD  08/13/21 [id number]

## 2021-08-13 NOTE — ED NOTES
Pt resting with eyes closed awakens easily alert oriented skin warm dry resp easy states meds helped headache     Shelley Rivers RN  08/13/21 1217

## 2021-10-28 ENCOUNTER — HOSPITAL ENCOUNTER (EMERGENCY)
Age: 41
Discharge: HOME | End: 2021-10-28
Attending: EMERGENCY MEDICINE
Payer: COMMERCIAL

## 2021-10-28 ENCOUNTER — APPOINTMENT (OUTPATIENT)
Dept: GENERAL RADIOLOGY | Age: 41
End: 2021-10-28
Payer: COMMERCIAL

## 2021-10-28 VITALS
BODY MASS INDEX: 28.93 KG/M2 | SYSTOLIC BLOOD PRESSURE: 145 MMHG | WEIGHT: 180 LBS | HEIGHT: 66 IN | OXYGEN SATURATION: 96 % | TEMPERATURE: 98.5 F | HEART RATE: 66 BPM | RESPIRATION RATE: 17 BRPM | DIASTOLIC BLOOD PRESSURE: 95 MMHG

## 2021-10-28 DIAGNOSIS — R07.9 CHEST PAIN, UNSPECIFIED TYPE: Primary | ICD-10-CM

## 2021-10-28 LAB
ALBUMIN SERPL-MCNC: 4.4 G/DL (ref 3.5–5.2)
ALP BLD-CCNC: 62 U/L (ref 40–129)
ALT SERPL-CCNC: 37 U/L (ref 0–40)
ANION GAP SERPL CALCULATED.3IONS-SCNC: 12 MMOL/L (ref 7–16)
APTT: 33.2 SEC (ref 24.5–35.1)
AST SERPL-CCNC: 36 U/L (ref 0–39)
BASOPHILS ABSOLUTE: 0.05 E9/L (ref 0–0.2)
BASOPHILS RELATIVE PERCENT: 0.5 % (ref 0–2)
BILIRUB SERPL-MCNC: 0.3 MG/DL (ref 0–1.2)
BUN BLDV-MCNC: 17 MG/DL (ref 6–20)
CALCIUM SERPL-MCNC: 8.9 MG/DL (ref 8.6–10.2)
CHLORIDE BLD-SCNC: 95 MMOL/L (ref 98–107)
CO2: 27 MMOL/L (ref 22–29)
CREAT SERPL-MCNC: 1.2 MG/DL (ref 0.7–1.2)
EKG ATRIAL RATE: 81 BPM
EKG P AXIS: 41 DEGREES
EKG P-R INTERVAL: 132 MS
EKG Q-T INTERVAL: 358 MS
EKG QRS DURATION: 82 MS
EKG QTC CALCULATION (BAZETT): 415 MS
EKG R AXIS: 86 DEGREES
EKG T AXIS: 67 DEGREES
EKG VENTRICULAR RATE: 81 BPM
EOSINOPHILS ABSOLUTE: 0.09 E9/L (ref 0.05–0.5)
EOSINOPHILS RELATIVE PERCENT: 0.8 % (ref 0–6)
GFR AFRICAN AMERICAN: >60
GFR NON-AFRICAN AMERICAN: >60 ML/MIN/1.73
GLUCOSE BLD-MCNC: 96 MG/DL (ref 74–99)
HCT VFR BLD CALC: 40.9 % (ref 37–54)
HEMOGLOBIN: 13.8 G/DL (ref 12.5–16.5)
IMMATURE GRANULOCYTES #: 0.02 E9/L
IMMATURE GRANULOCYTES %: 0.2 % (ref 0–5)
INR BLD: 0.8
LYMPHOCYTES ABSOLUTE: 4.34 E9/L (ref 1.5–4)
LYMPHOCYTES RELATIVE PERCENT: 39.1 % (ref 20–42)
MCH RBC QN AUTO: 32.7 PG (ref 26–35)
MCHC RBC AUTO-ENTMCNC: 33.7 % (ref 32–34.5)
MCV RBC AUTO: 96.9 FL (ref 80–99.9)
MONOCYTES ABSOLUTE: 1.09 E9/L (ref 0.1–0.95)
MONOCYTES RELATIVE PERCENT: 9.8 % (ref 2–12)
NEUTROPHILS ABSOLUTE: 5.52 E9/L (ref 1.8–7.3)
NEUTROPHILS RELATIVE PERCENT: 49.6 % (ref 43–80)
PDW BLD-RTO: 13.3 FL (ref 11.5–15)
PLATELET # BLD: 317 E9/L (ref 130–450)
PMV BLD AUTO: 10.9 FL (ref 7–12)
POTASSIUM REFLEX MAGNESIUM: 3.8 MMOL/L (ref 3.5–5)
PROTHROMBIN TIME: 9.3 SEC (ref 9.3–12.4)
RBC # BLD: 4.22 E12/L (ref 3.8–5.8)
SODIUM BLD-SCNC: 134 MMOL/L (ref 132–146)
TOTAL PROTEIN: 6.5 G/DL (ref 6.4–8.3)
TROPONIN, HIGH SENSITIVITY: 6 NG/L (ref 0–11)
TROPONIN, HIGH SENSITIVITY: <6 NG/L (ref 0–11)
WBC # BLD: 11.1 E9/L (ref 4.5–11.5)

## 2021-10-28 PROCEDURE — 99283 EMERGENCY DEPT VISIT LOW MDM: CPT

## 2021-10-28 PROCEDURE — 80053 COMPREHEN METABOLIC PANEL: CPT

## 2021-10-28 PROCEDURE — 93010 ELECTROCARDIOGRAM REPORT: CPT | Performed by: INTERNAL MEDICINE

## 2021-10-28 PROCEDURE — 85025 COMPLETE CBC W/AUTO DIFF WBC: CPT

## 2021-10-28 PROCEDURE — 71045 X-RAY EXAM CHEST 1 VIEW: CPT

## 2021-10-28 PROCEDURE — 85610 PROTHROMBIN TIME: CPT

## 2021-10-28 PROCEDURE — 6370000000 HC RX 637 (ALT 250 FOR IP): Performed by: PHYSICIAN ASSISTANT

## 2021-10-28 PROCEDURE — 85730 THROMBOPLASTIN TIME PARTIAL: CPT

## 2021-10-28 PROCEDURE — 93005 ELECTROCARDIOGRAM TRACING: CPT | Performed by: PHYSICIAN ASSISTANT

## 2021-10-28 PROCEDURE — 84484 ASSAY OF TROPONIN QUANT: CPT

## 2021-10-28 RX ORDER — HYDROCHLOROTHIAZIDE 25 MG/1
25 TABLET ORAL DAILY
COMMUNITY

## 2021-10-28 RX ORDER — ACETAMINOPHEN 325 MG/1
650 TABLET ORAL ONCE
Status: COMPLETED | OUTPATIENT
Start: 2021-10-28 | End: 2021-10-28

## 2021-10-28 RX ORDER — LISINOPRIL 10 MG/1
10 TABLET ORAL DAILY
COMMUNITY

## 2021-10-28 RX ORDER — NITROGLYCERIN 0.4 MG/1
0.4 TABLET SUBLINGUAL ONCE
Status: COMPLETED | OUTPATIENT
Start: 2021-10-28 | End: 2021-10-28

## 2021-10-28 RX ADMIN — ACETAMINOPHEN 650 MG: 325 TABLET ORAL at 04:02

## 2021-10-28 RX ADMIN — NITROGLYCERIN 0.4 MG: 0.4 TABLET SUBLINGUAL at 04:02

## 2021-10-28 ASSESSMENT — PAIN DESCRIPTION - PAIN TYPE: TYPE: ACUTE PAIN

## 2021-10-28 ASSESSMENT — PAIN DESCRIPTION - LOCATION: LOCATION: CHEST

## 2021-10-28 ASSESSMENT — PAIN DESCRIPTION - ONSET: ONSET: SUDDEN

## 2021-10-28 ASSESSMENT — PAIN SCALES - GENERAL: PAINLEVEL_OUTOF10: 8

## 2021-10-28 ASSESSMENT — PAIN DESCRIPTION - FREQUENCY: FREQUENCY: CONTINUOUS

## 2021-10-28 ASSESSMENT — PAIN DESCRIPTION - ORIENTATION: ORIENTATION: MID

## 2021-10-28 ASSESSMENT — PAIN DESCRIPTION - DESCRIPTORS: DESCRIPTORS: SHARP

## 2021-10-28 ASSESSMENT — PAIN DESCRIPTION - PROGRESSION: CLINICAL_PROGRESSION: NOT CHANGED

## 2021-10-28 NOTE — ED NOTES
Pt states chest pain feels better after tylenol and NTG. Pt encouraged to follow up with PCP regarding CP and HTN. VS obtained. Discharge instructions discussed with pt and visitor.       Ady Boyd RN  10/28/21 [id number]

## 2021-10-28 NOTE — ED PROVIDER NOTES
Seen with attending        [facility]  Department of Emergency Medicine   ED  Encounter Note  Admit Date/RoomTime: 10/28/2021  2:11 AM  ED Room: 14A/14A-14    NAME: Simon Jovel  : 1980  MRN: [id number]     Chief Complaint:  Chest Pain (Midsternal chest pain, non radiating x one hour. Denies nausea, vomiting or SOB)    HISTORY OF PRESENT ILLNESS        Simon Jovel is a 39 y.o. male who presents to the ED by private vehicle for chest pain that started 2 hours prior to arrival to the emergency department. Patient states that the pain is rated as an 8 out of 10. Patient states does not radiate anywhere and is located substernally. Patient denies any associated nausea or vomiting. He states that he occasionally feels slightly short of breath. Patient denies any history of any heart issues but states that his brother recently had a heart attack. Patient states he has a history of hypertension and takes amlodipine, lisinopril, and hydrochlorothiazide. Patient states that he also takes atorvastatin for hyperlipidemia. Patient also uses an inhaler for when he states his asthma. Patient states that he is currently a smoker and smokes about a pack a day. Patient states he has a previous history of alcohol abuse but he does not drink currently. Patient denies any fevers or chills. Patient denies any history of any blood clots or any unilateral leg swelling. Patient denies any recent surgery or recent travel. ROS   Pertinent positives and negatives are stated within HPI, all other systems reviewed and are negative. Past Medical History:  has a past medical history of ETOH abuse, GERD (gastroesophageal reflux disease), HLD (hyperlipidemia), HTN (hypertension), Polysubstance abuse ([facility] [address] and Tobacco abuse. Surgical History:  has no past surgical history on file. Social History:  reports that he has been smoking cigarettes. He has been smoking about 1.00 pack per day. He has never used smokeless tobacco. He reports current alcohol use. He reports current drug use. Drugs: IV, Cocaine, and Marijuana. Family History: family history is not on file. Allergies: Patient has no known allergies. PHYSICAL EXAM   Oxygen Saturation Interpretation: Normal on room air analysis. ED Triage Vitals   BP Temp Temp Source Pulse Resp SpO2 Height Weight   10/28/21 0149 10/28/21 0142 10/28/21 0142 10/28/21 0142 10/28/21 0142 10/28/21 0142 10/28/21 0143 10/28/21 0143   (!) 175/97 97.7 °F (36.5 °C) Oral 85 16 96 % 5' 6\" (1.676 m) 180 lb (81.6 kg)         Physical Exam  Constitutional/General: Alert and oriented x3, well appearing, non toxic. HEENT:  NC/NT. PERRLA,  Airway patent. Neck: Supple, full ROM  Respiratory: Lungs clear to auscultation bilaterally, no wheezes, rales, or rhonchi. Not in respiratory distress   CV:  Regular rate. Regular rhythm. No murmurs  Chest: No chest wall tenderness  GI:  Abdomen Soft, Non tender, Non distended. +BS. No rebound, guarding, or rigidity. Musculoskeletal: Moves all extremities x 4. Integument: skin warm and dry. No rashes.    Lymphatic: no lymphadenopathy noted  Neurologic: GCS 15, no focal deficits, cranial nerves grossly intact  Psychiatric: Normal Affect      Lab / Imaging Results   (All laboratory and radiology results have been personally reviewed by myself)  Labs:  Results for orders placed or performed during the hospital encounter of 10/28/21   CBC Auto Differential   Result Value Ref Range    WBC 11.1 4.5 - 11.5 E9/L    RBC 4.22 3.80 - 5.80 E12/L    Hemoglobin 13.8 12.5 - 16.5 g/dL    Hematocrit 40.9 37.0 - 54.0 %    MCV 96.9 80.0 - 99.9 fL    MCH 32.7 26.0 - 35.0 pg    MCHC 33.7 32.0 - 34.5 %    RDW 13.3 11.5 - 15.0 fL    Platelets 636 526 - 039 E9/L    MPV 10.9 7.0 - 12.0 fL    Neutrophils % 49.6 43.0 - 80.0 %    Immature Granulocytes % 0.2 0.0 - 5.0 %    Lymphocytes % 39.1 20.0 - 42.0 %    Monocytes % 9.8 2.0 - 12.0 % Eosinophils % 0.8 0.0 - 6.0 %    Basophils % 0.5 0.0 - 2.0 %    Neutrophils Absolute 5.52 1.80 - 7.30 E9/L    Immature Granulocytes # 0.02 E9/L    Lymphocytes Absolute 4.34 (H) 1.50 - 4.00 E9/L    Monocytes Absolute 1.09 (H) 0.10 - 0.95 E9/L    Eosinophils Absolute 0.09 0.05 - 0.50 E9/L    Basophils Absolute 0.05 0.00 - 0.20 E9/L   Comprehensive Metabolic Panel w/ Reflex to MG   Result Value Ref Range    Sodium 134 132 - 146 mmol/L    Potassium reflex Magnesium 3.8 3.5 - 5.0 mmol/L    Chloride 95 (L) 98 - 107 mmol/L    CO2 27 22 - 29 mmol/L    Anion Gap 12 7 - 16 mmol/L    Glucose 96 74 - 99 mg/dL    BUN 17 6 - 20 mg/dL    CREATININE 1.2 0.7 - 1.2 mg/dL    GFR Non-African American >60 >=60 mL/min/1.73    GFR African American >60     Calcium 8.9 8.6 - 10.2 mg/dL    Total Protein 6.5 6.4 - 8.3 g/dL    Albumin 4.4 3.5 - 5.2 g/dL    Total Bilirubin 0.3 0.0 - 1.2 mg/dL    Alkaline Phosphatase 62 40 - 129 U/L    ALT 37 0 - 40 U/L    AST 36 0 - 39 U/L   Troponin   Result Value Ref Range    Troponin, High Sensitivity <6 0 - 11 ng/L   Protime-INR   Result Value Ref Range    Protime 9.3 9.3 - 12.4 sec    INR 0.8    APTT   Result Value Ref Range    aPTT 33.2 24.5 - 35.1 sec   Troponin   Result Value Ref Range    Troponin, High Sensitivity 6 0 - 11 ng/L   EKG 12 Lead   Result Value Ref Range    Ventricular Rate 81 BPM    Atrial Rate 81 BPM    P-R Interval 132 ms    QRS Duration 82 ms    Q-T Interval 358 ms    QTc Calculation (Bazett) 415 ms    P Axis 41 degrees    R Axis 86 degrees    T Axis 67 degrees     Imaging: All Radiology results interpreted by Radiologist unless otherwise noted. XR CHEST PORTABLE   Final Result   No acute abnormality identified. EKG #1:  Interpreted by emergency department attending physician unless otherwise noted. 10/28/21  Time: 1:52  Normal sinus rhythm with a ventricular rate of 81 bpm, P interval 132 ms, QT/QTc of 358/415 ms. No STEMI.     ED Course / Medical Decision Making Medications   acetaminophen (TYLENOL) tablet 650 mg (650 mg Oral Given 10/28/21 0402)   nitroGLYCERIN (NITROSTAT) SL tablet 0.4 mg (0.4 mg SubLINGual Given 10/28/21 0402)     ED Course as of Oct 28 1806   Thu Oct 28, 2021   2288 Patient underwent laboratory work-up which showed a normal CBC. Normal chemistry. Troponin was less than 6 and 6. Negative delta. EKG showed septal infarct with nonspecific findings unchanged from previous EKG. On reevaluation after second troponin the patient is chest pain-free. Patient is low risk on heart score but patient was offered admission for stress test and further monitoring but patient politely declined. I discussed with him the need for close follow-up and an outpatient stress test and he understands and will call his doctor today and schedule an outpatient stress test.  He was given strict return precautions and will return to emergency department if he has return of his chest pain or suffers any other concerning symptoms. Return precautions given. Patient agrees with plan.    [CB]      ED Course User Index  [CB] Fanta Gandara DO     Re-Evaluations:  10/28/21      Time: 3:35    Patients condition remains unchanged. Reevaluated the patient. Patient states that his pain is still 6 or 7 out of 10. Discussed plan of care going forward the patient. We will provide the patient with some nitro and Tylenol at this time to see if it will assist his chest pain. I informed him that his for troponin was negative we will do a delta troponin. Consultations:             None    Procedures:   none    MDM:  400  Pt signed out to attending pending delta trop and re-evaluation. Plan of Care/Counseling:  Jennifer Felipe and EM Attending Physician reviewed today's visit with the patient in addition to providing specific details for the plan of care and counseling regarding the diagnosis and prognosis.   Questions are answered at this time and are agreeable with the plan. ASSESSMENT     1. Chest pain, unspecified type         PLAN   See attendings note     New Medications     Discharge Medication List as of 10/28/2021  4:32 AM        Electronically signed by Aga Hyman PA-C   DD: 10/28/21  **This report was transcribed using voice recognition software. Every effort was made to ensure accuracy; however, inadvertent computerized transcription errors may be present.   END OF PROVIDER NOTE       Aga Hyman PA-C  10/28/21 2042

## 2022-05-09 ENCOUNTER — HOSPITAL ENCOUNTER (EMERGENCY)
Age: 42
Discharge: LEFT BEFORE DISCHARGE | End: 2022-05-10
Attending: EMERGENCY MEDICINE
Payer: COMMERCIAL

## 2022-05-09 DIAGNOSIS — R07.9 CHEST PAIN, UNSPECIFIED TYPE: Primary | ICD-10-CM

## 2022-05-09 PROCEDURE — 99285 EMERGENCY DEPT VISIT HI MDM: CPT

## 2022-05-10 ENCOUNTER — APPOINTMENT (OUTPATIENT)
Dept: GENERAL RADIOLOGY | Age: 42
End: 2022-05-10
Payer: COMMERCIAL

## 2022-05-10 VITALS
RESPIRATION RATE: 16 BRPM | HEIGHT: 67 IN | OXYGEN SATURATION: 97 % | DIASTOLIC BLOOD PRESSURE: 92 MMHG | HEART RATE: 83 BPM | TEMPERATURE: 98.5 F | BODY MASS INDEX: 27.47 KG/M2 | WEIGHT: 175 LBS | SYSTOLIC BLOOD PRESSURE: 145 MMHG

## 2022-05-10 LAB
ALBUMIN SERPL-MCNC: 4.6 G/DL (ref 3.5–5.2)
ALP BLD-CCNC: 70 U/L (ref 40–129)
ALT SERPL-CCNC: 29 U/L (ref 0–40)
AMPHETAMINE SCREEN, URINE: POSITIVE
ANION GAP SERPL CALCULATED.3IONS-SCNC: 12 MMOL/L (ref 7–16)
AST SERPL-CCNC: 32 U/L (ref 0–39)
BARBITURATE SCREEN URINE: NOT DETECTED
BASOPHILS ABSOLUTE: 0.04 E9/L (ref 0–0.2)
BASOPHILS RELATIVE PERCENT: 0.5 % (ref 0–2)
BENZODIAZEPINE SCREEN, URINE: NOT DETECTED
BILIRUB SERPL-MCNC: 0.3 MG/DL (ref 0–1.2)
BUN BLDV-MCNC: 7 MG/DL (ref 6–20)
CALCIUM SERPL-MCNC: 9.3 MG/DL (ref 8.6–10.2)
CANNABINOID SCREEN URINE: POSITIVE
CHLORIDE BLD-SCNC: 94 MMOL/L (ref 98–107)
CO2: 28 MMOL/L (ref 22–29)
COCAINE METABOLITE SCREEN URINE: NOT DETECTED
CREAT SERPL-MCNC: 1.1 MG/DL (ref 0.7–1.2)
D DIMER: <200 NG/ML DDU
EKG ATRIAL RATE: 85 BPM
EKG P AXIS: 45 DEGREES
EKG P-R INTERVAL: 132 MS
EKG Q-T INTERVAL: 346 MS
EKG QRS DURATION: 86 MS
EKG QTC CALCULATION (BAZETT): 411 MS
EKG R AXIS: 87 DEGREES
EKG T AXIS: 63 DEGREES
EKG VENTRICULAR RATE: 85 BPM
EOSINOPHILS ABSOLUTE: 0.04 E9/L (ref 0.05–0.5)
EOSINOPHILS RELATIVE PERCENT: 0.5 % (ref 0–6)
FENTANYL SCREEN, URINE: NOT DETECTED
GFR AFRICAN AMERICAN: >60
GFR NON-AFRICAN AMERICAN: >60 ML/MIN/1.73
GLUCOSE BLD-MCNC: 97 MG/DL (ref 74–99)
HCT VFR BLD CALC: 41.9 % (ref 37–54)
HEMOGLOBIN: 14 G/DL (ref 12.5–16.5)
IMMATURE GRANULOCYTES #: 0.03 E9/L
IMMATURE GRANULOCYTES %: 0.4 % (ref 0–5)
LYMPHOCYTES ABSOLUTE: 2.56 E9/L (ref 1.5–4)
LYMPHOCYTES RELATIVE PERCENT: 33.7 % (ref 20–42)
Lab: ABNORMAL
MCH RBC QN AUTO: 32.1 PG (ref 26–35)
MCHC RBC AUTO-ENTMCNC: 33.4 % (ref 32–34.5)
MCV RBC AUTO: 96.1 FL (ref 80–99.9)
METHADONE SCREEN, URINE: NOT DETECTED
MONOCYTES ABSOLUTE: 0.79 E9/L (ref 0.1–0.95)
MONOCYTES RELATIVE PERCENT: 10.4 % (ref 2–12)
NEUTROPHILS ABSOLUTE: 4.14 E9/L (ref 1.8–7.3)
NEUTROPHILS RELATIVE PERCENT: 54.5 % (ref 43–80)
OPIATE SCREEN URINE: NOT DETECTED
OXYCODONE URINE: NOT DETECTED
PDW BLD-RTO: 13 FL (ref 11.5–15)
PHENCYCLIDINE SCREEN URINE: NOT DETECTED
PLATELET # BLD: 194 E9/L (ref 130–450)
PMV BLD AUTO: 11.4 FL (ref 7–12)
POTASSIUM SERPL-SCNC: 3.2 MMOL/L (ref 3.5–5)
RBC # BLD: 4.36 E12/L (ref 3.8–5.8)
SODIUM BLD-SCNC: 134 MMOL/L (ref 132–146)
TOTAL PROTEIN: 7.3 G/DL (ref 6.4–8.3)
TROPONIN, HIGH SENSITIVITY: 10 NG/L (ref 0–11)
WBC # BLD: 7.6 E9/L (ref 4.5–11.5)

## 2022-05-10 PROCEDURE — 80307 DRUG TEST PRSMV CHEM ANLYZR: CPT

## 2022-05-10 PROCEDURE — 85025 COMPLETE CBC W/AUTO DIFF WBC: CPT

## 2022-05-10 PROCEDURE — 84484 ASSAY OF TROPONIN QUANT: CPT

## 2022-05-10 PROCEDURE — 71045 X-RAY EXAM CHEST 1 VIEW: CPT

## 2022-05-10 PROCEDURE — 85378 FIBRIN DEGRADE SEMIQUANT: CPT

## 2022-05-10 PROCEDURE — 80053 COMPREHEN METABOLIC PANEL: CPT

## 2022-05-10 PROCEDURE — 93005 ELECTROCARDIOGRAM TRACING: CPT | Performed by: EMERGENCY MEDICINE

## 2022-05-10 PROCEDURE — 93010 ELECTROCARDIOGRAM REPORT: CPT | Performed by: INTERNAL MEDICINE

## 2022-05-10 ASSESSMENT — PAIN DESCRIPTION - LOCATION: LOCATION: CHEST

## 2022-05-10 ASSESSMENT — PAIN - FUNCTIONAL ASSESSMENT: PAIN_FUNCTIONAL_ASSESSMENT: 0-10

## 2022-05-10 ASSESSMENT — PAIN DESCRIPTION - DESCRIPTORS: DESCRIPTORS: SHARP

## 2022-05-10 ASSESSMENT — PAIN SCALES - GENERAL: PAINLEVEL_OUTOF10: 8

## 2022-05-10 ASSESSMENT — PAIN DESCRIPTION - ONSET: ONSET: SUDDEN

## 2022-05-10 ASSESSMENT — PAIN DESCRIPTION - PAIN TYPE: TYPE: ACUTE PAIN

## 2022-05-10 ASSESSMENT — PAIN DESCRIPTION - ORIENTATION: ORIENTATION: LEFT

## 2022-05-10 NOTE — ED NOTES
At this time this nurse attempted call patient back for vital signs and patient is not in the waiting room or restroom      Zully Harmon RN  05/10/22 [id number]

## 2022-08-25 ENCOUNTER — HOSPITAL ENCOUNTER (EMERGENCY)
Age: 42
Discharge: HOME | End: 2022-08-25
Attending: EMERGENCY MEDICINE
Payer: COMMERCIAL

## 2022-08-25 ENCOUNTER — APPOINTMENT (OUTPATIENT)
Dept: GENERAL RADIOLOGY | Age: 42
End: 2022-08-25
Payer: COMMERCIAL

## 2022-08-25 VITALS
HEIGHT: 67 IN | TEMPERATURE: 98 F | SYSTOLIC BLOOD PRESSURE: 153 MMHG | RESPIRATION RATE: 18 BRPM | BODY MASS INDEX: 27.47 KG/M2 | HEART RATE: 74 BPM | DIASTOLIC BLOOD PRESSURE: 107 MMHG | OXYGEN SATURATION: 99 % | WEIGHT: 175 LBS

## 2022-08-25 DIAGNOSIS — R19.7 NAUSEA, VOMITING AND DIARRHEA: Primary | ICD-10-CM

## 2022-08-25 DIAGNOSIS — R07.9 CHEST PAIN, UNSPECIFIED TYPE: ICD-10-CM

## 2022-08-25 DIAGNOSIS — R11.2 NAUSEA, VOMITING AND DIARRHEA: Primary | ICD-10-CM

## 2022-08-25 LAB
ALBUMIN SERPL-MCNC: 5.1 G/DL (ref 3.5–5.2)
ALP BLD-CCNC: 85 U/L (ref 40–129)
ALT SERPL-CCNC: 30 U/L (ref 0–40)
ANION GAP SERPL CALCULATED.3IONS-SCNC: 15 MMOL/L (ref 7–16)
AST SERPL-CCNC: 34 U/L (ref 0–39)
BACTERIA: ABNORMAL /HPF
BASOPHILS ABSOLUTE: 0.04 E9/L (ref 0–0.2)
BASOPHILS RELATIVE PERCENT: 0.5 % (ref 0–2)
BILIRUB SERPL-MCNC: 0.6 MG/DL (ref 0–1.2)
BILIRUBIN URINE: NEGATIVE
BLOOD, URINE: NEGATIVE
BUN BLDV-MCNC: 10 MG/DL (ref 6–20)
CALCIUM SERPL-MCNC: 10 MG/DL (ref 8.6–10.2)
CHLORIDE BLD-SCNC: 96 MMOL/L (ref 98–107)
CLARITY: CLEAR
CO2: 27 MMOL/L (ref 22–29)
COLOR: YELLOW
CREAT SERPL-MCNC: 1.1 MG/DL (ref 0.7–1.2)
EOSINOPHILS ABSOLUTE: 0 E9/L (ref 0.05–0.5)
EOSINOPHILS RELATIVE PERCENT: 0 % (ref 0–6)
GFR AFRICAN AMERICAN: >60
GFR NON-AFRICAN AMERICAN: >60 ML/MIN/1.73
GLUCOSE BLD-MCNC: 128 MG/DL (ref 74–99)
GLUCOSE URINE: NEGATIVE MG/DL
HCT VFR BLD CALC: 44.8 % (ref 37–54)
HEMOGLOBIN: 15.1 G/DL (ref 12.5–16.5)
IMMATURE GRANULOCYTES #: 0.02 E9/L
IMMATURE GRANULOCYTES %: 0.3 % (ref 0–5)
KETONES, URINE: ABNORMAL MG/DL
LEUKOCYTE ESTERASE, URINE: NEGATIVE
LIPASE: 39 U/L (ref 13–60)
LYMPHOCYTES ABSOLUTE: 1.43 E9/L (ref 1.5–4)
LYMPHOCYTES RELATIVE PERCENT: 18.4 % (ref 20–42)
MAGNESIUM: 1.9 MG/DL (ref 1.6–2.6)
MCH RBC QN AUTO: 33 PG (ref 26–35)
MCHC RBC AUTO-ENTMCNC: 33.7 % (ref 32–34.5)
MCV RBC AUTO: 98 FL (ref 80–99.9)
MONOCYTES ABSOLUTE: 0.55 E9/L (ref 0.1–0.95)
MONOCYTES RELATIVE PERCENT: 7.1 % (ref 2–12)
MUCUS: PRESENT /LPF
NEUTROPHILS ABSOLUTE: 5.72 E9/L (ref 1.8–7.3)
NEUTROPHILS RELATIVE PERCENT: 73.7 % (ref 43–80)
NITRITE, URINE: NEGATIVE
PDW BLD-RTO: 13.7 FL (ref 11.5–15)
PH UA: 6 (ref 5–9)
PLATELET # BLD: 283 E9/L (ref 130–450)
PMV BLD AUTO: 11 FL (ref 7–12)
POTASSIUM REFLEX MAGNESIUM: 3.3 MMOL/L (ref 3.5–5)
PROTEIN UA: ABNORMAL MG/DL
RBC # BLD: 4.57 E12/L (ref 3.8–5.8)
RBC UA: ABNORMAL /HPF (ref 0–2)
SARS-COV-2, NAAT: NOT DETECTED
SODIUM BLD-SCNC: 138 MMOL/L (ref 132–146)
SPECIFIC GRAVITY UA: >=1.03 (ref 1–1.03)
TOTAL PROTEIN: 8.5 G/DL (ref 6.4–8.3)
TROPONIN, HIGH SENSITIVITY: 7 NG/L (ref 0–11)
TROPONIN, HIGH SENSITIVITY: 8 NG/L (ref 0–11)
UROBILINOGEN, URINE: 0.2 E.U./DL
WBC # BLD: 7.8 E9/L (ref 4.5–11.5)
WBC UA: ABNORMAL /HPF (ref 0–5)

## 2022-08-25 PROCEDURE — 83735 ASSAY OF MAGNESIUM: CPT

## 2022-08-25 PROCEDURE — 71045 X-RAY EXAM CHEST 1 VIEW: CPT

## 2022-08-25 PROCEDURE — 96375 TX/PRO/DX INJ NEW DRUG ADDON: CPT

## 2022-08-25 PROCEDURE — 87635 SARS-COV-2 COVID-19 AMP PRB: CPT

## 2022-08-25 PROCEDURE — 84484 ASSAY OF TROPONIN QUANT: CPT

## 2022-08-25 PROCEDURE — 2580000003 HC RX 258: Performed by: EMERGENCY MEDICINE

## 2022-08-25 PROCEDURE — 96374 THER/PROPH/DIAG INJ IV PUSH: CPT

## 2022-08-25 PROCEDURE — 99285 EMERGENCY DEPT VISIT HI MDM: CPT

## 2022-08-25 PROCEDURE — 6360000002 HC RX W HCPCS: Performed by: EMERGENCY MEDICINE

## 2022-08-25 PROCEDURE — 85025 COMPLETE CBC W/AUTO DIFF WBC: CPT

## 2022-08-25 PROCEDURE — 80053 COMPREHEN METABOLIC PANEL: CPT

## 2022-08-25 PROCEDURE — 81001 URINALYSIS AUTO W/SCOPE: CPT

## 2022-08-25 PROCEDURE — 83690 ASSAY OF LIPASE: CPT

## 2022-08-25 RX ORDER — FENTANYL CITRATE 50 UG/ML
50 INJECTION, SOLUTION INTRAMUSCULAR; INTRAVENOUS ONCE
Status: COMPLETED | OUTPATIENT
Start: 2022-08-25 | End: 2022-08-25

## 2022-08-25 RX ORDER — 0.9 % SODIUM CHLORIDE 0.9 %
1000 INTRAVENOUS SOLUTION INTRAVENOUS ONCE
Status: COMPLETED | OUTPATIENT
Start: 2022-08-25 | End: 2022-08-25

## 2022-08-25 RX ORDER — ATORVASTATIN CALCIUM 20 MG/1
20 TABLET, FILM COATED ORAL DAILY
COMMUNITY

## 2022-08-25 RX ORDER — ONDANSETRON 4 MG/1
4 TABLET, ORALLY DISINTEGRATING ORAL EVERY 8 HOURS PRN
Qty: 20 TABLET | Refills: 0 | Status: SHIPPED | OUTPATIENT
Start: 2022-08-25 | End: 2022-09-01

## 2022-08-25 RX ORDER — ONDANSETRON 2 MG/ML
4 INJECTION INTRAMUSCULAR; INTRAVENOUS ONCE
Status: COMPLETED | OUTPATIENT
Start: 2022-08-25 | End: 2022-08-25

## 2022-08-25 RX ORDER — FAMOTIDINE 20 MG/1
20 TABLET, FILM COATED ORAL 2 TIMES DAILY
Qty: 60 TABLET | Refills: 0 | Status: SHIPPED | OUTPATIENT
Start: 2022-08-25 | End: 2022-10-28

## 2022-08-25 RX ADMIN — ONDANSETRON HYDROCHLORIDE 4 MG: 2 SOLUTION INTRAMUSCULAR; INTRAVENOUS at 09:44

## 2022-08-25 RX ADMIN — SODIUM CHLORIDE 1000 ML: 9 INJECTION, SOLUTION INTRAVENOUS at 09:47

## 2022-08-25 RX ADMIN — FENTANYL CITRATE 50 MCG: 50 INJECTION, SOLUTION INTRAMUSCULAR; INTRAVENOUS at 09:45

## 2022-08-25 ASSESSMENT — PAIN DESCRIPTION - PAIN TYPE: TYPE: ACUTE PAIN

## 2022-08-25 ASSESSMENT — PAIN DESCRIPTION - LOCATION
LOCATION: CHEST
LOCATION: CHEST

## 2022-08-25 ASSESSMENT — PAIN DESCRIPTION - DESCRIPTORS
DESCRIPTORS: SHARP
DESCRIPTORS: SHARP

## 2022-08-25 ASSESSMENT — PAIN SCALES - GENERAL
PAINLEVEL_OUTOF10: 8
PAINLEVEL_OUTOF10: 9

## 2022-08-25 ASSESSMENT — PAIN DESCRIPTION - ORIENTATION
ORIENTATION: LEFT
ORIENTATION: LEFT

## 2022-08-25 ASSESSMENT — PAIN DESCRIPTION - FREQUENCY: FREQUENCY: INTERMITTENT

## 2022-08-25 ASSESSMENT — PAIN - FUNCTIONAL ASSESSMENT: PAIN_FUNCTIONAL_ASSESSMENT: 0-10

## 2022-08-25 NOTE — ED PROVIDER NOTES
HPI:  8/25/22,   Time: 9:34 AM EDT       Kiara Dougherty is a 43 y.o. male presenting to the ED for cp/n/v/d, beginning 2 days ago. The complaint has been persistent, mild in severity, and worsened by nothing. Sharp pain, no abd pain. Hx same, seen here 2 months ago for. No fever/chills/sweats. Mild ha. Feels lightheaded. Nothing makes better. No rash/skin lesion/cough/congestion/sore throat. Bib private vehicle    Review of Systems:   Pertinent positives and negatives are stated within HPI, all other systems reviewed and are negative.          --------------------------------------------- PAST HISTORY ---------------------------------------------  Past Medical History:  has a past medical history of ETOH abuse, GERD (gastroesophageal reflux disease), HLD (hyperlipidemia), HTN (hypertension), Polysubstance abuse ([facility] [address] and Tobacco abuse. Past Surgical History:  has no past surgical history on file. Social History:  reports that he has been smoking cigarettes. He has been smoking an average of 1 pack per day. He has never used smokeless tobacco. He reports current alcohol use. He reports current drug use. Drugs: IV, Cocaine, and Marijuana ([facility]). Family History: family history is not on file. The patients home medications have been reviewed. Allergies: Patient has no known allergies. ---------------------------------------------------PHYSICAL EXAM--------------------------------------    Constitutional/General: Alert and oriented x3, well appearing, non toxic in NAD  Head: Normocephalic and atraumatic  Eyes: PERRL, EOMI, conjunctive normal, sclera non icteric  Mouth: Oropharynx clear, handling secretions, no trismus, no asymmetry of the posterior oropharynx or uvular edema  Neck: Supple, full ROM,   Respiratory: Lungs clear to auscultation bilaterally, no wheezes, rales, or rhonchi. Not in respiratory distress  Cardiovascular:  Regular rate. Regular rhythm.  No murmurs, gallops, or rubs. 2+ distal pulses  Chest: No chest wall tenderness  GI:  Abdomen Soft, Non tender, Non distended. .   Musculoskeletal: Moves all extremities x 4. Warm and well perfused, s  Integument: skin warm and dry. No rashes. Lymphatic: no lymphadenopathy noted  Neurologic: GCS 15, no focal deficits,   Psychiatric: Normal Affect    -------------------------------------------------- RESULTS -------------------------------------------------  I have personally reviewed all laboratory and imaging results for this patient. Results are listed below.      LABS:  Results for orders placed or performed during the hospital encounter of 08/25/22   COVID-19, Rapid    Specimen: Nasopharyngeal Swab   Result Value Ref Range    SARS-CoV-2, NAAT Not Detected Not Detected   CBC with Auto Differential   Result Value Ref Range    WBC 7.8 4.5 - 11.5 E9/L    RBC 4.57 3.80 - 5.80 E12/L    Hemoglobin 15.1 12.5 - 16.5 g/dL    Hematocrit 44.8 37.0 - 54.0 %    MCV 98.0 80.0 - 99.9 fL    MCH 33.0 26.0 - 35.0 pg    MCHC 33.7 32.0 - 34.5 %    RDW 13.7 11.5 - 15.0 fL    Platelets 075 505 - 710 E9/L    MPV 11.0 7.0 - 12.0 fL    Neutrophils % 73.7 43.0 - 80.0 %    Immature Granulocytes % 0.3 0.0 - 5.0 %    Lymphocytes % 18.4 (L) 20.0 - 42.0 %    Monocytes % 7.1 2.0 - 12.0 %    Eosinophils % 0.0 0.0 - 6.0 %    Basophils % 0.5 0.0 - 2.0 %    Neutrophils Absolute 5.72 1.80 - 7.30 E9/L    Immature Granulocytes # 0.02 E9/L    Lymphocytes Absolute 1.43 (L) 1.50 - 4.00 E9/L    Monocytes Absolute 0.55 0.10 - 0.95 E9/L    Eosinophils Absolute 0.00 (L) 0.05 - 0.50 E9/L    Basophils Absolute 0.04 0.00 - 0.20 E9/L   Comprehensive Metabolic Panel w/ Reflex to MG   Result Value Ref Range    Sodium 138 132 - 146 mmol/L    Potassium reflex Magnesium 3.3 (L) 3.5 - 5.0 mmol/L    Chloride 96 (L) 98 - 107 mmol/L    CO2 27 22 - 29 mmol/L    Anion Gap 15 7 - 16 mmol/L    Glucose 128 (H) 74 - 99 mg/dL    BUN 10 6 - 20 mg/dL    Creatinine 1.1 0.7 - 1.2 mg/dL    GFR Non-African American >60 >=60 mL/min/1.73    GFR African American >60     Calcium 10.0 8.6 - 10.2 mg/dL    Total Protein 8.5 (H) 6.4 - 8.3 g/dL    Albumin 5.1 3.5 - 5.2 g/dL    Total Bilirubin 0.6 0.0 - 1.2 mg/dL    Alkaline Phosphatase 85 40 - 129 U/L    ALT 30 0 - 40 U/L    AST 34 0 - 39 U/L   Troponin   Result Value Ref Range    Troponin, High Sensitivity 8 0 - 11 ng/L   Lipase   Result Value Ref Range    Lipase 39 13 - 60 U/L   Urinalysis with Microscopic   Result Value Ref Range    Color, UA Yellow Straw/Yellow    Clarity, UA Clear Clear    Glucose, Ur Negative Negative mg/dL    Bilirubin Urine Negative Negative    Ketones, Urine TRACE (A) Negative mg/dL    Specific Gravity, UA >=1.030 1.005 - 1.030    Blood, Urine Negative Negative    pH, UA 6.0 5.0 - 9.0    Protein, UA TRACE Negative mg/dL    Urobilinogen, Urine 0.2 <2.0 E.U./dL    Nitrite, Urine Negative Negative    Leukocyte Esterase, Urine Negative Negative    Mucus, UA Present (A) None Seen /LPF    WBC, UA 5-10 (A) 0 - 5 /HPF    RBC, UA NONE 0 - 2 /HPF    Bacteria, UA RARE (A) None Seen /HPF   Magnesium   Result Value Ref Range    Magnesium 1.9 1.6 - 2.6 mg/dL   Troponin   Result Value Ref Range    Troponin, High Sensitivity 7 0 - 11 ng/L       RADIOLOGY:  Interpreted by Radiologist.  XR CHEST PORTABLE   Final Result   No active cardiopulmonary disease. EKG:  This EKG is signed and interpreted by the EP. Time: 922  Rate: 75  Rhythm: Sinus  Interpretation: non-specific EKG  Comparison: stable as compared to patient's most recent EKG      ------------------------- NURSING NOTES AND VITALS REVIEWED ---------------------------   The nursing notes within the ED encounter and vital signs as below have been reviewed by myself.   BP (!) 153/107   Pulse 74   Temp 98 °F (36.7 °C)   Resp 18   Ht 5' 7\" (1.702 m)   Wt 175 lb (79.4 kg)   SpO2 99%   BMI 27.41 kg/m²   Oxygen Saturation Interpretation: Normal    The patients available past medical records and past encounters were reviewed. ------------------------------ ED COURSE/MEDICAL DECISION MAKING----------------------  Medications   0.9 % sodium chloride bolus (0 mLs IntraVENous Stopped 8/25/22 1135)   ondansetron (ZOFRAN) injection 4 mg (4 mg IntraVENous Given 8/25/22 0944)   fentaNYL (SUBLIMAZE) injection 50 mcg (50 mcg IntraVENous Given 8/25/22 0945)         ED COURSE:       Medical Decision Making:    Multi visits for similar sx. N/v/d and cp. Trop x 2 neg. Not cad by hx, heart score <3. Dc with supp care and outpt fu      This patient's ED course included: a personal history and physicial examination    This patient has remained hemodynamically stable during their ED course. Re-Evaluations:             Re-evaluation. Patients symptoms are improving    Re-examination  8/25/22   1 pmEDT          Vital Signs:   Vitals:    08/25/22 0916 08/25/22 1318   BP: (!) 154/101 (!) 153/107   Pulse: 83 74   Resp: 16 18   Temp: 97.7 °F (36.5 °C) 98 °F (36.7 °C)   TempSrc: Oral    SpO2: 100% 99%   Weight: 175 lb (79.4 kg)    Height: 5' 7\" (1.702 m)              Counseling: The emergency provider has spoken with the patient and discussed todays results, in addition to providing specific details for the plan of care and counseling regarding the diagnosis and prognosis. Questions are answered at this time and they are agreeable with the plan.       --------------------------------- IMPRESSION AND DISPOSITION ---------------------------------    IMPRESSION  1. Nausea, vomiting and diarrhea    2. Chest pain, unspecified type        DISPOSITION  Disposition: Discharge to home  Patient condition is stable    NOTE: This report was transcribed using voice recognition software.  Every effort was made to ensure accuracy; however, inadvertent computerized transcription errors may be present        Michelle John MD  08/26/22 6881

## 2022-08-25 NOTE — PROGRESS NOTES
CLINICAL PHARMACY NOTE: MEDS TO BEDS    Total # of Prescriptions Filled: 2   The following medications were delivered to the patient:  PEPCID 20 MG  ZOFRAN 4 MG ODT    Additional Documentation:

## 2022-08-30 LAB
EKG ATRIAL RATE: 75 BPM
EKG P AXIS: 38 DEGREES
EKG P-R INTERVAL: 132 MS
EKG Q-T INTERVAL: 374 MS
EKG QRS DURATION: 82 MS
EKG QTC CALCULATION (BAZETT): 417 MS
EKG R AXIS: 84 DEGREES
EKG T AXIS: 70 DEGREES
EKG VENTRICULAR RATE: 75 BPM

## 2022-10-21 RX ORDER — BUDESONIDE AND FORMOTEROL FUMARATE DIHYDRATE 160; 4.5 UG/1; UG/1
2 AEROSOL RESPIRATORY (INHALATION) 2 TIMES DAILY
COMMUNITY

## 2023-09-07 ENCOUNTER — APPOINTMENT (OUTPATIENT)
Dept: CT IMAGING | Age: 43
End: 2023-09-07
Payer: COMMERCIAL

## 2023-09-07 ENCOUNTER — APPOINTMENT (OUTPATIENT)
Dept: GENERAL RADIOLOGY | Age: 43
End: 2023-09-07
Payer: COMMERCIAL

## 2023-09-07 ENCOUNTER — HOSPITAL ENCOUNTER (EMERGENCY)
Age: 43
Discharge: HOME | End: 2023-09-07
Attending: EMERGENCY MEDICINE
Payer: COMMERCIAL

## 2023-09-07 VITALS
SYSTOLIC BLOOD PRESSURE: 144 MMHG | TEMPERATURE: 98 F | OXYGEN SATURATION: 98 % | RESPIRATION RATE: 18 BRPM | WEIGHT: 175 LBS | HEART RATE: 100 BPM | BODY MASS INDEX: 27.41 KG/M2 | DIASTOLIC BLOOD PRESSURE: 88 MMHG

## 2023-09-07 DIAGNOSIS — R10.11 ABDOMINAL PAIN, RIGHT UPPER QUADRANT: ICD-10-CM

## 2023-09-07 DIAGNOSIS — N28.1 RENAL CYST, RIGHT: ICD-10-CM

## 2023-09-07 DIAGNOSIS — R07.9 CHEST PAIN, UNSPECIFIED TYPE: Primary | ICD-10-CM

## 2023-09-07 LAB
ALBUMIN SERPL-MCNC: 4.5 G/DL (ref 3.5–5.2)
ALP SERPL-CCNC: 87 U/L (ref 40–129)
ALT SERPL-CCNC: 39 U/L (ref 0–40)
ANION GAP SERPL CALCULATED.3IONS-SCNC: 14 MMOL/L (ref 7–16)
AST SERPL-CCNC: 24 U/L (ref 0–39)
BASOPHILS # BLD: 0.03 K/UL (ref 0–0.2)
BASOPHILS NFR BLD: 0 % (ref 0–2)
BILIRUB SERPL-MCNC: 0.3 MG/DL (ref 0–1.2)
BNP SERPL-MCNC: 41 PG/ML (ref 0–125)
BUN SERPL-MCNC: 15 MG/DL (ref 6–20)
CALCIUM SERPL-MCNC: 9.2 MG/DL (ref 8.6–10.2)
CHLORIDE SERPL-SCNC: 104 MMOL/L (ref 98–107)
CO2 SERPL-SCNC: 22 MMOL/L (ref 22–29)
CREAT SERPL-MCNC: 1 MG/DL (ref 0.7–1.2)
EOSINOPHIL # BLD: 0.07 K/UL (ref 0.05–0.5)
EOSINOPHILS RELATIVE PERCENT: 1 % (ref 0–6)
ERYTHROCYTE [DISTWIDTH] IN BLOOD BY AUTOMATED COUNT: 14.6 % (ref 11.5–15)
FLUAV RNA RESP QL NAA+PROBE: NOT DETECTED
FLUBV RNA RESP QL NAA+PROBE: NOT DETECTED
GFR SERPL CREATININE-BSD FRML MDRD: >60 ML/MIN/1.73M2
GLUCOSE SERPL-MCNC: 95 MG/DL (ref 74–99)
HCT VFR BLD AUTO: 49.7 % (ref 37–54)
HGB BLD-MCNC: 16.4 G/DL (ref 12.5–16.5)
IMM GRANULOCYTES # BLD AUTO: <0.03 K/UL (ref 0–0.58)
IMM GRANULOCYTES NFR BLD: 0 % (ref 0–5)
LIPASE SERPL-CCNC: 28 U/L (ref 13–60)
LYMPHOCYTES NFR BLD: 2.69 K/UL (ref 1.5–4)
LYMPHOCYTES RELATIVE PERCENT: 38 % (ref 20–42)
MCH RBC QN AUTO: 30.5 PG (ref 26–35)
MCHC RBC AUTO-ENTMCNC: 33 G/DL (ref 32–34.5)
MCV RBC AUTO: 92.6 FL (ref 80–99.9)
MONOCYTES NFR BLD: 0.63 K/UL (ref 0.1–0.95)
MONOCYTES NFR BLD: 9 % (ref 2–12)
NEUTROPHILS NFR BLD: 52 % (ref 43–80)
NEUTS SEG NFR BLD: 3.69 K/UL (ref 1.8–7.3)
PLATELET # BLD AUTO: 274 K/UL (ref 130–450)
PMV BLD AUTO: 11 FL (ref 7–12)
POTASSIUM SERPL-SCNC: 4.4 MMOL/L (ref 3.5–5)
PROT SERPL-MCNC: 8.2 G/DL (ref 6.4–8.3)
RBC # BLD AUTO: 5.37 M/UL (ref 3.8–5.8)
SARS-COV-2 RNA RESP QL NAA+PROBE: NOT DETECTED
SODIUM SERPL-SCNC: 140 MMOL/L (ref 132–146)
SOURCE: NORMAL
SPECIMEN DESCRIPTION: NORMAL
TROPONIN I SERPL HS-MCNC: 10 NG/L (ref 0–11)
TROPONIN I SERPL HS-MCNC: 11 NG/L (ref 0–11)
WBC OTHER # BLD: 7.1 K/UL (ref 4.5–11.5)

## 2023-09-07 PROCEDURE — 99285 EMERGENCY DEPT VISIT HI MDM: CPT

## 2023-09-07 PROCEDURE — 85025 COMPLETE CBC W/AUTO DIFF WBC: CPT

## 2023-09-07 PROCEDURE — 80053 COMPREHEN METABOLIC PANEL: CPT

## 2023-09-07 PROCEDURE — 74177 CT ABD & PELVIS W/CONTRAST: CPT

## 2023-09-07 PROCEDURE — 84484 ASSAY OF TROPONIN QUANT: CPT

## 2023-09-07 PROCEDURE — 83880 ASSAY OF NATRIURETIC PEPTIDE: CPT

## 2023-09-07 PROCEDURE — 83690 ASSAY OF LIPASE: CPT

## 2023-09-07 PROCEDURE — 87636 SARSCOV2 & INF A&B AMP PRB: CPT

## 2023-09-07 PROCEDURE — 2580000003 HC RX 258

## 2023-09-07 PROCEDURE — 93005 ELECTROCARDIOGRAM TRACING: CPT

## 2023-09-07 PROCEDURE — 71275 CT ANGIOGRAPHY CHEST: CPT

## 2023-09-07 PROCEDURE — 6360000004 HC RX CONTRAST MEDICATION: Performed by: RADIOLOGY

## 2023-09-07 PROCEDURE — 71045 X-RAY EXAM CHEST 1 VIEW: CPT

## 2023-09-07 RX ORDER — 0.9 % SODIUM CHLORIDE 0.9 %
1000 INTRAVENOUS SOLUTION INTRAVENOUS ONCE
Status: COMPLETED | OUTPATIENT
Start: 2023-09-07 | End: 2023-09-07

## 2023-09-07 RX ADMIN — SODIUM CHLORIDE 1000 ML: 9 INJECTION, SOLUTION INTRAVENOUS at 15:18

## 2023-09-07 RX ADMIN — IOPAMIDOL 75 ML: 755 INJECTION, SOLUTION INTRAVENOUS at 18:38

## 2023-09-07 ASSESSMENT — LIFESTYLE VARIABLES
HOW OFTEN DO YOU HAVE A DRINK CONTAINING ALCOHOL: NEVER
HOW MANY STANDARD DRINKS CONTAINING ALCOHOL DO YOU HAVE ON A TYPICAL DAY: PATIENT DOES NOT DRINK

## 2023-09-07 ASSESSMENT — PAIN SCALES - GENERAL: PAINLEVEL_OUTOF10: 6

## 2023-09-07 ASSESSMENT — PAIN - FUNCTIONAL ASSESSMENT: PAIN_FUNCTIONAL_ASSESSMENT: 0-10

## 2023-09-08 LAB
EKG ATRIAL RATE: 94 BPM
EKG P AXIS: 37 DEGREES
EKG P-R INTERVAL: 132 MS
EKG Q-T INTERVAL: 338 MS
EKG QRS DURATION: 84 MS
EKG QTC CALCULATION (BAZETT): 422 MS
EKG R AXIS: 81 DEGREES
EKG T AXIS: 63 DEGREES
EKG VENTRICULAR RATE: 94 BPM

## 2023-09-08 PROCEDURE — 93010 ELECTROCARDIOGRAM REPORT: CPT | Performed by: INTERNAL MEDICINE

## 2023-11-02 ENCOUNTER — HOSPITAL ENCOUNTER (EMERGENCY)
Age: 43
Discharge: HOME | End: 2023-11-02
Attending: EMERGENCY MEDICINE
Payer: COMMERCIAL

## 2023-11-02 ENCOUNTER — APPOINTMENT (OUTPATIENT)
Dept: CT IMAGING | Age: 43
End: 2023-11-02
Payer: COMMERCIAL

## 2023-11-02 VITALS
BODY MASS INDEX: 32.89 KG/M2 | SYSTOLIC BLOOD PRESSURE: 146 MMHG | WEIGHT: 210 LBS | RESPIRATION RATE: 20 BRPM | TEMPERATURE: 98 F | DIASTOLIC BLOOD PRESSURE: 93 MMHG | HEART RATE: 100 BPM | OXYGEN SATURATION: 98 %

## 2023-11-02 DIAGNOSIS — K59.00 CONSTIPATION, UNSPECIFIED CONSTIPATION TYPE: Primary | ICD-10-CM

## 2023-11-02 LAB
ANION GAP SERPL CALCULATED.3IONS-SCNC: 7 MMOL/L (ref 7–16)
BASOPHILS # BLD: 0.04 K/UL (ref 0–0.2)
BASOPHILS NFR BLD: 0 % (ref 0–2)
BILIRUB UR QL STRIP: NEGATIVE
BUN SERPL-MCNC: 18 MG/DL (ref 6–20)
CALCIUM SERPL-MCNC: 9.2 MG/DL (ref 8.6–10.2)
CHLORIDE SERPL-SCNC: 103 MMOL/L (ref 98–107)
CLARITY UR: CLEAR
CO2 SERPL-SCNC: 28 MMOL/L (ref 22–29)
COLOR UR: YELLOW
COMMENT: ABNORMAL
CREAT SERPL-MCNC: 0.9 MG/DL (ref 0.7–1.2)
EOSINOPHIL # BLD: 0.17 K/UL (ref 0.05–0.5)
EOSINOPHILS RELATIVE PERCENT: 2 % (ref 0–6)
ERYTHROCYTE [DISTWIDTH] IN BLOOD BY AUTOMATED COUNT: 14.5 % (ref 11.5–15)
GFR SERPL CREATININE-BSD FRML MDRD: >60 ML/MIN/1.73M2
GLUCOSE SERPL-MCNC: 218 MG/DL (ref 74–99)
GLUCOSE UR STRIP-MCNC: 250 MG/DL
HCT VFR BLD AUTO: 48.5 % (ref 37–54)
HGB BLD-MCNC: 15.4 G/DL (ref 12.5–16.5)
HGB UR QL STRIP.AUTO: NEGATIVE
IMM GRANULOCYTES # BLD AUTO: 0.03 K/UL (ref 0–0.58)
IMM GRANULOCYTES NFR BLD: 0 % (ref 0–5)
KETONES UR STRIP-MCNC: NEGATIVE MG/DL
LEUKOCYTE ESTERASE UR QL STRIP: NEGATIVE
LYMPHOCYTES NFR BLD: 2.8 K/UL (ref 1.5–4)
LYMPHOCYTES RELATIVE PERCENT: 31 % (ref 20–42)
MCH RBC QN AUTO: 30.6 PG (ref 26–35)
MCHC RBC AUTO-ENTMCNC: 31.8 G/DL (ref 32–34.5)
MCV RBC AUTO: 96.4 FL (ref 80–99.9)
MONOCYTES NFR BLD: 0.76 K/UL (ref 0.1–0.95)
MONOCYTES NFR BLD: 9 % (ref 2–12)
NEUTROPHILS NFR BLD: 58 % (ref 43–80)
NEUTS SEG NFR BLD: 5.12 K/UL (ref 1.8–7.3)
NITRITE UR QL STRIP: NEGATIVE
PH UR STRIP: 5.5 [PH] (ref 5–9)
PLATELET # BLD AUTO: 309 K/UL (ref 130–450)
PMV BLD AUTO: 11 FL (ref 7–12)
POTASSIUM SERPL-SCNC: 4.4 MMOL/L (ref 3.5–5)
PROT UR STRIP-MCNC: NEGATIVE MG/DL
RBC # BLD AUTO: 5.03 M/UL (ref 3.8–5.8)
SODIUM SERPL-SCNC: 138 MMOL/L (ref 132–146)
SP GR UR STRIP: 1.02 (ref 1–1.03)
UROBILINOGEN UR STRIP-ACNC: 0.2 EU/DL (ref 0–1)
WBC OTHER # BLD: 8.9 K/UL (ref 4.5–11.5)

## 2023-11-02 PROCEDURE — 6360000004 HC RX CONTRAST MEDICATION: Performed by: RADIOLOGY

## 2023-11-02 PROCEDURE — 2580000003 HC RX 258: Performed by: EMERGENCY MEDICINE

## 2023-11-02 PROCEDURE — 80048 BASIC METABOLIC PNL TOTAL CA: CPT

## 2023-11-02 PROCEDURE — 81003 URINALYSIS AUTO W/O SCOPE: CPT

## 2023-11-02 PROCEDURE — 74177 CT ABD & PELVIS W/CONTRAST: CPT

## 2023-11-02 PROCEDURE — 99285 EMERGENCY DEPT VISIT HI MDM: CPT

## 2023-11-02 PROCEDURE — 85025 COMPLETE CBC W/AUTO DIFF WBC: CPT

## 2023-11-02 RX ORDER — 0.9 % SODIUM CHLORIDE 0.9 %
1000 INTRAVENOUS SOLUTION INTRAVENOUS ONCE
Status: COMPLETED | OUTPATIENT
Start: 2023-11-02 | End: 2023-11-02

## 2023-11-02 RX ORDER — POLYETHYLENE GLYCOL 3350 17 G/17G
17 POWDER, FOR SOLUTION ORAL DAILY PRN
Qty: 30 PACKET | Refills: 0 | Status: SHIPPED | OUTPATIENT
Start: 2023-11-02 | End: 2023-12-02

## 2023-11-02 RX ADMIN — IOPAMIDOL 70 ML: 755 INJECTION, SOLUTION INTRAVENOUS at 11:10

## 2023-11-02 RX ADMIN — SODIUM CHLORIDE 1000 ML: 9 INJECTION, SOLUTION INTRAVENOUS at 09:58

## 2023-11-02 ASSESSMENT — PAIN SCALES - GENERAL: PAINLEVEL_OUTOF10: 8

## 2023-11-02 ASSESSMENT — PAIN - FUNCTIONAL ASSESSMENT: PAIN_FUNCTIONAL_ASSESSMENT: 0-10

## 2023-11-02 NOTE — ED PROVIDER NOTES
[address]        Pt Name: Ana Maria Gonsales  MRN: [id number]  [address] [facility] [place] 1980  Date of evaluation: 11/2/2023  Provider: Fiona Brooks DO  PCP: No primary care provider on file. Note Started: 9:10 AM EDT 11/2/23    CHIEF COMPLAINT       Chief Complaint   Patient presents with    Abdominal Pain     Stated that the pain started 2 days ago and that he had bloody emesis yesterday. Left lower abd    Emesis       HISTORY OF PRESENT ILLNESS: 1 or more Elements   History From: patient    Limitations to history : None    Ana Maria Gonsales is a 37 y.o. male who presents to the ED for evaluation of abdominal pain with associated hematemesis. Patient reports he has left lower quadrant abdominal pain. Has been present for the past couple days. Had an episode of nausea and vomiting yesterday with blood. Has had no nausea or vomiting since then. Has not noticed any black tarry stools or blood in the stool. No pain rating into his back or chest.  No associated dizziness or lightheadedness. He states the pain is mild to moderate in severity. Patient states that the pain is worse when he attempts to sit up from a lying position. Prior history of appendectomy with colostomy. That has been taking down. No reported fever or chills. Nursing Notes were all reviewed and agreed with or any disagreements were addressed in the HPI. REVIEW OF EXTERNAL NOTE :         REVIEW OF SYSTEMS :           Positives and Pertinent negatives as per HPI. SURGICAL HISTORY   History reviewed. No pertinent surgical history.     CURRENTMEDICATIONS       Previous Medications    AMLODIPINE (NORVASC) 10 MG TABLET    Take 10 mg by mouth daily    ATORVASTATIN (LIPITOR) 20 MG TABLET    Take 20 mg by mouth daily    BUDESONIDE-FORMOTEROL (SYMBICORT) 160-4.5 MCG/ACT AERO    Inhale 2 puffs into the lungs 2 times daily    HYDROCHLOROTHIAZIDE (HYDRODIURIL) 25
